# Patient Record
Sex: MALE | Race: BLACK OR AFRICAN AMERICAN | Employment: UNEMPLOYED | URBAN - METROPOLITAN AREA
[De-identification: names, ages, dates, MRNs, and addresses within clinical notes are randomized per-mention and may not be internally consistent; named-entity substitution may affect disease eponyms.]

---

## 2022-06-28 ENCOUNTER — HOSPITAL ENCOUNTER (EMERGENCY)
Age: 72
Discharge: ANOTHER HOSPITAL | End: 2022-06-28
Attending: EMERGENCY MEDICINE
Payer: MEDICARE

## 2022-06-28 ENCOUNTER — HOSPITAL ENCOUNTER (INPATIENT)
Age: 72
LOS: 4 days | Discharge: HOME HEALTH | DRG: 513 | End: 2022-07-02
Attending: INTERNAL MEDICINE | Admitting: INTERNAL MEDICINE
Payer: MEDICARE

## 2022-06-28 ENCOUNTER — APPOINTMENT (OUTPATIENT)
Dept: GENERAL RADIOLOGY | Age: 72
End: 2022-06-28
Payer: MEDICARE

## 2022-06-28 VITALS
HEIGHT: 71 IN | OXYGEN SATURATION: 99 % | WEIGHT: 149 LBS | HEART RATE: 75 BPM | DIASTOLIC BLOOD PRESSURE: 74 MMHG | BODY MASS INDEX: 20.86 KG/M2 | SYSTOLIC BLOOD PRESSURE: 112 MMHG | RESPIRATION RATE: 18 BRPM | TEMPERATURE: 98.1 F

## 2022-06-28 DIAGNOSIS — M86.9 FINGER OSTEOMYELITIS, LEFT (HCC): ICD-10-CM

## 2022-06-28 DIAGNOSIS — E87.6 HYPOKALEMIA: ICD-10-CM

## 2022-06-28 DIAGNOSIS — M86.9 FINGER OSTEOMYELITIS, LEFT (HCC): Primary | ICD-10-CM

## 2022-06-28 LAB
ALBUMIN SERPL-MCNC: 2.7 G/DL (ref 3.5–4.6)
ALP BLD-CCNC: 136 U/L (ref 35–104)
ALT SERPL-CCNC: 32 U/L (ref 0–41)
ANION GAP SERPL CALCULATED.3IONS-SCNC: 13 MEQ/L (ref 9–15)
AST SERPL-CCNC: 58 U/L (ref 0–40)
BILIRUB SERPL-MCNC: 0.5 MG/DL (ref 0.2–0.7)
BUN BLDV-MCNC: 10 MG/DL (ref 8–23)
CALCIUM SERPL-MCNC: 8.6 MG/DL (ref 8.5–9.9)
CHLORIDE BLD-SCNC: 94 MEQ/L (ref 95–107)
CO2: 27 MEQ/L (ref 20–31)
CREAT SERPL-MCNC: 0.72 MG/DL (ref 0.7–1.2)
GFR AFRICAN AMERICAN: >60
GFR NON-AFRICAN AMERICAN: >60
GLOBULIN: 4.4 G/DL (ref 2.3–3.5)
GLUCOSE BLD-MCNC: 137 MG/DL (ref 70–99)
HCT VFR BLD CALC: 31.1 % (ref 42–52)
HEMOGLOBIN: 10.7 G/DL (ref 13.7–17.5)
MCH RBC QN AUTO: 34.3 PG (ref 25.7–32.2)
MCHC RBC AUTO-ENTMCNC: 34.4 % (ref 32.3–36.5)
MCV RBC AUTO: 99.7 FL (ref 79–92.2)
PDW BLD-RTO: 13.3 % (ref 11.6–14.4)
PLATELET # BLD: 271 K/UL (ref 163–337)
POTASSIUM SERPL-SCNC: 3 MEQ/L (ref 3.4–4.9)
RBC # BLD: 3.12 M/UL (ref 4.63–6.08)
SODIUM BLD-SCNC: 134 MEQ/L (ref 135–144)
TOTAL PROTEIN: 7.1 G/DL (ref 6.3–8)
WBC # BLD: 19.8 K/UL (ref 4.2–9)

## 2022-06-28 PROCEDURE — 96367 TX/PROPH/DG ADDL SEQ IV INF: CPT

## 2022-06-28 PROCEDURE — 96365 THER/PROPH/DIAG IV INF INIT: CPT

## 2022-06-28 PROCEDURE — 36415 COLL VENOUS BLD VENIPUNCTURE: CPT

## 2022-06-28 PROCEDURE — 85027 COMPLETE CBC AUTOMATED: CPT

## 2022-06-28 PROCEDURE — 6360000002 HC RX W HCPCS: Performed by: INTERNAL MEDICINE

## 2022-06-28 PROCEDURE — 6360000002 HC RX W HCPCS: Performed by: EMERGENCY MEDICINE

## 2022-06-28 PROCEDURE — 99285 EMERGENCY DEPT VISIT HI MDM: CPT

## 2022-06-28 PROCEDURE — 96366 THER/PROPH/DIAG IV INF ADDON: CPT

## 2022-06-28 PROCEDURE — 73140 X-RAY EXAM OF FINGER(S): CPT

## 2022-06-28 PROCEDURE — 2580000003 HC RX 258: Performed by: INTERNAL MEDICINE

## 2022-06-28 PROCEDURE — 90715 TDAP VACCINE 7 YRS/> IM: CPT | Performed by: EMERGENCY MEDICINE

## 2022-06-28 PROCEDURE — 6370000000 HC RX 637 (ALT 250 FOR IP): Performed by: EMERGENCY MEDICINE

## 2022-06-28 PROCEDURE — 80053 COMPREHEN METABOLIC PANEL: CPT

## 2022-06-28 PROCEDURE — 90471 IMMUNIZATION ADMIN: CPT | Performed by: EMERGENCY MEDICINE

## 2022-06-28 PROCEDURE — 87040 BLOOD CULTURE FOR BACTERIA: CPT

## 2022-06-28 PROCEDURE — 2580000003 HC RX 258: Performed by: EMERGENCY MEDICINE

## 2022-06-28 PROCEDURE — 1210000000 HC MED SURG R&B

## 2022-06-28 RX ORDER — ACETAMINOPHEN 650 MG/1
650 SUPPOSITORY RECTAL EVERY 6 HOURS PRN
Status: DISCONTINUED | OUTPATIENT
Start: 2022-06-28 | End: 2022-07-02 | Stop reason: HOSPADM

## 2022-06-28 RX ORDER — AMLODIPINE BESYLATE 5 MG/1
5 TABLET ORAL DAILY
Status: ON HOLD | COMMUNITY
Start: 2022-04-13 | End: 2022-07-01 | Stop reason: HOSPADM

## 2022-06-28 RX ORDER — SODIUM CHLORIDE 0.9 % (FLUSH) 0.9 %
3 SYRINGE (ML) INJECTION EVERY 8 HOURS
Status: DISCONTINUED | OUTPATIENT
Start: 2022-06-28 | End: 2022-06-28 | Stop reason: HOSPADM

## 2022-06-28 RX ORDER — SODIUM CHLORIDE 0.9 % (FLUSH) 0.9 %
5-40 SYRINGE (ML) INJECTION EVERY 12 HOURS SCHEDULED
Status: DISCONTINUED | OUTPATIENT
Start: 2022-06-28 | End: 2022-07-02 | Stop reason: HOSPADM

## 2022-06-28 RX ORDER — ONDANSETRON 4 MG/1
4 TABLET, ORALLY DISINTEGRATING ORAL EVERY 8 HOURS PRN
Status: DISCONTINUED | OUTPATIENT
Start: 2022-06-28 | End: 2022-07-02 | Stop reason: HOSPADM

## 2022-06-28 RX ORDER — POTASSIUM CHLORIDE 20 MEQ/1
40 TABLET, EXTENDED RELEASE ORAL ONCE
Status: COMPLETED | OUTPATIENT
Start: 2022-06-28 | End: 2022-06-28

## 2022-06-28 RX ORDER — ACETAMINOPHEN 325 MG/1
650 TABLET ORAL EVERY 6 HOURS PRN
Status: DISCONTINUED | OUTPATIENT
Start: 2022-06-28 | End: 2022-07-02 | Stop reason: HOSPADM

## 2022-06-28 RX ORDER — ENOXAPARIN SODIUM 100 MG/ML
40 INJECTION SUBCUTANEOUS DAILY
Status: DISCONTINUED | OUTPATIENT
Start: 2022-06-28 | End: 2022-07-02 | Stop reason: HOSPADM

## 2022-06-28 RX ORDER — POLYETHYLENE GLYCOL 3350 17 G/17G
17 POWDER, FOR SOLUTION ORAL DAILY PRN
Status: DISCONTINUED | OUTPATIENT
Start: 2022-06-28 | End: 2022-07-02 | Stop reason: HOSPADM

## 2022-06-28 RX ORDER — SODIUM CHLORIDE 0.9 % (FLUSH) 0.9 %
5-40 SYRINGE (ML) INJECTION PRN
Status: DISCONTINUED | OUTPATIENT
Start: 2022-06-28 | End: 2022-07-02 | Stop reason: HOSPADM

## 2022-06-28 RX ORDER — ATORVASTATIN CALCIUM 40 MG/1
40 TABLET, FILM COATED ORAL DAILY
Status: ON HOLD | COMMUNITY
Start: 2022-06-08 | End: 2022-07-01 | Stop reason: HOSPADM

## 2022-06-28 RX ORDER — ONDANSETRON 2 MG/ML
4 INJECTION INTRAMUSCULAR; INTRAVENOUS EVERY 6 HOURS PRN
Status: DISCONTINUED | OUTPATIENT
Start: 2022-06-28 | End: 2022-07-02 | Stop reason: HOSPADM

## 2022-06-28 RX ORDER — SODIUM CHLORIDE 9 MG/ML
25 INJECTION, SOLUTION INTRAVENOUS PRN
Status: DISCONTINUED | OUTPATIENT
Start: 2022-06-28 | End: 2022-07-02 | Stop reason: HOSPADM

## 2022-06-28 RX ADMIN — TETANUS TOXOID, REDUCED DIPHTHERIA TOXOID AND ACELLULAR PERTUSSIS VACCINE, ADSORBED 0.5 ML: 5; 2.5; 8; 8; 2.5 SUSPENSION INTRAMUSCULAR at 13:11

## 2022-06-28 RX ADMIN — PIPERACILLIN AND TAZOBACTAM 3375 MG: 3; .375 INJECTION, POWDER, LYOPHILIZED, FOR SOLUTION INTRAVENOUS at 22:29

## 2022-06-28 RX ADMIN — CEFTRIAXONE 1000 MG: 1 INJECTION, POWDER, FOR SOLUTION INTRAMUSCULAR; INTRAVENOUS at 13:10

## 2022-06-28 RX ADMIN — POTASSIUM CHLORIDE 40 MEQ: 20 TABLET, EXTENDED RELEASE ORAL at 14:05

## 2022-06-28 RX ADMIN — VANCOMYCIN HYDROCHLORIDE 1000 MG: 1 INJECTION, POWDER, LYOPHILIZED, FOR SOLUTION INTRAVENOUS at 14:05

## 2022-06-28 RX ADMIN — Medication 10 ML: at 22:00

## 2022-06-28 ASSESSMENT — PAIN DESCRIPTION - LOCATION
LOCATION: ARM
LOCATION: FINGER (COMMENT WHICH ONE)

## 2022-06-28 ASSESSMENT — ENCOUNTER SYMPTOMS
VOMITING: 0
BACK PAIN: 0
SORE THROAT: 0
SINUS PAIN: 0
EYE REDNESS: 0
NAUSEA: 0
EYE PAIN: 0
SHORTNESS OF BREATH: 0
COUGH: 0
ABDOMINAL PAIN: 0
COLOR CHANGE: 1

## 2022-06-28 ASSESSMENT — PAIN SCALES - GENERAL
PAINLEVEL_OUTOF10: 3
PAINLEVEL_OUTOF10: 0

## 2022-06-28 ASSESSMENT — PAIN - FUNCTIONAL ASSESSMENT: PAIN_FUNCTIONAL_ASSESSMENT: 0-10

## 2022-06-28 ASSESSMENT — PAIN DESCRIPTION - DESCRIPTORS: DESCRIPTORS: ACHING

## 2022-06-28 ASSESSMENT — PAIN DESCRIPTION - FREQUENCY: FREQUENCY: CONTINUOUS

## 2022-06-28 ASSESSMENT — PAIN DESCRIPTION - ORIENTATION: ORIENTATION: LEFT

## 2022-06-28 NOTE — ED NOTES
Call from transfer center with bed assignment. Patient assigned bed 480-1. Number for report is [phone].        Theo García  06/28/22 1827

## 2022-06-28 NOTE — ED NOTES
Dr. Tanner Marcos states Dr. Sergio Baxter is on call. Dr. Sergio Baxter spoke with Dr. Alex Heath.       Linda Stanley  06/28/22 6662

## 2022-06-28 NOTE — ED NOTES
Patient report called to [address] [place] [address] room [address], [place]  06/28/22 6378
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
## 2022-06-28 NOTE — ED NOTES
ETA on [place] ([facility]) is 20:30.       Ana Muro  06/28/22 0959
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
## 2022-06-28 NOTE — ED NOTES
Dr. Jeffry Soliz requested patient be transferred to [facility]. Called transfer to [facility] hospitalist for  to  consult.       [place]ia Zazueta  06/28/22 5775

## 2022-06-29 PROBLEM — M86.142: Status: ACTIVE | Noted: 2022-06-28

## 2022-06-29 LAB
ALBUMIN SERPL-MCNC: 2.7 G/DL (ref 3.5–4.6)
ALP BLD-CCNC: 130 U/L (ref 35–104)
ALT SERPL-CCNC: 41 U/L (ref 0–41)
ANION GAP SERPL CALCULATED.3IONS-SCNC: 12 MEQ/L (ref 9–15)
AST SERPL-CCNC: 67 U/L (ref 0–40)
BANDED NEUTROPHILS RELATIVE PERCENT: 1 %
BASOPHILS ABSOLUTE: 0 K/UL (ref 0–0.2)
BASOPHILS RELATIVE PERCENT: 0.3 %
BILIRUB SERPL-MCNC: 0.6 MG/DL (ref 0.2–0.7)
BUN BLDV-MCNC: 8 MG/DL (ref 8–23)
CALCIUM SERPL-MCNC: 8.3 MG/DL (ref 8.5–9.9)
CHLORIDE BLD-SCNC: 94 MEQ/L (ref 95–107)
CO2: 27 MEQ/L (ref 20–31)
CREAT SERPL-MCNC: 0.68 MG/DL (ref 0.7–1.2)
EOSINOPHILS ABSOLUTE: 0 K/UL (ref 0–0.7)
EOSINOPHILS RELATIVE PERCENT: 0.1 %
GFR AFRICAN AMERICAN: >60
GFR NON-AFRICAN AMERICAN: >60
GLOBULIN: 4.1 G/DL (ref 2.3–3.5)
GLUCOSE BLD-MCNC: 127 MG/DL (ref 70–99)
HCT VFR BLD CALC: 32.6 % (ref 42–52)
HEMOGLOBIN: 11 G/DL (ref 14–18)
LYMPHOCYTES ABSOLUTE: 0.2 K/UL (ref 1–4.8)
LYMPHOCYTES RELATIVE PERCENT: 1 %
MAGNESIUM: 1.8 MG/DL (ref 1.7–2.4)
MCH RBC QN AUTO: 34.8 PG (ref 27–31.3)
MCHC RBC AUTO-ENTMCNC: 33.6 % (ref 33–37)
MCV RBC AUTO: 103.3 FL (ref 80–100)
MONOCYTES ABSOLUTE: 1.9 K/UL (ref 0.2–0.8)
MONOCYTES RELATIVE PERCENT: 12.1 %
NEUTROPHILS ABSOLUTE: 13.9 K/UL (ref 1.4–6.5)
NEUTROPHILS RELATIVE PERCENT: 86 %
PDW BLD-RTO: 14.6 % (ref 11.5–14.5)
PLATELET # BLD: 283 K/UL (ref 130–400)
PLATELET SLIDE REVIEW: NORMAL
POTASSIUM REFLEX MAGNESIUM: 3 MEQ/L (ref 3.4–4.9)
RBC # BLD: 3.16 M/UL (ref 4.7–6.1)
RBC # BLD: NORMAL 10*6/UL
SODIUM BLD-SCNC: 133 MEQ/L (ref 135–144)
TOTAL PROTEIN: 6.8 G/DL (ref 6.3–8)
WBC # BLD: 16 K/UL (ref 4.8–10.8)

## 2022-06-29 PROCEDURE — 80053 COMPREHEN METABOLIC PANEL: CPT

## 2022-06-29 PROCEDURE — 6370000000 HC RX 637 (ALT 250 FOR IP): Performed by: INTERNAL MEDICINE

## 2022-06-29 PROCEDURE — 99222 1ST HOSP IP/OBS MODERATE 55: CPT | Performed by: INTERNAL MEDICINE

## 2022-06-29 PROCEDURE — 36415 COLL VENOUS BLD VENIPUNCTURE: CPT

## 2022-06-29 PROCEDURE — 2580000003 HC RX 258: Performed by: INTERNAL MEDICINE

## 2022-06-29 PROCEDURE — 6360000002 HC RX W HCPCS: Performed by: INTERNAL MEDICINE

## 2022-06-29 PROCEDURE — 85025 COMPLETE CBC W/AUTO DIFF WBC: CPT

## 2022-06-29 PROCEDURE — 99211 OFF/OP EST MAY X REQ PHY/QHP: CPT

## 2022-06-29 PROCEDURE — 1210000000 HC MED SURG R&B

## 2022-06-29 PROCEDURE — 83735 ASSAY OF MAGNESIUM: CPT

## 2022-06-29 RX ORDER — AMLODIPINE BESYLATE 5 MG/1
5 TABLET ORAL DAILY
Status: DISCONTINUED | OUTPATIENT
Start: 2022-06-29 | End: 2022-06-30

## 2022-06-29 RX ORDER — CARVEDILOL 12.5 MG/1
25 TABLET ORAL 2 TIMES DAILY
Status: DISCONTINUED | OUTPATIENT
Start: 2022-06-29 | End: 2022-07-02 | Stop reason: HOSPADM

## 2022-06-29 RX ORDER — POTASSIUM CHLORIDE 750 MG/1
20 CAPSULE, EXTENDED RELEASE ORAL 3 TIMES DAILY
Status: COMPLETED | OUTPATIENT
Start: 2022-06-29 | End: 2022-06-29

## 2022-06-29 RX ORDER — ATORVASTATIN CALCIUM 40 MG/1
40 TABLET, FILM COATED ORAL DAILY
Status: DISCONTINUED | OUTPATIENT
Start: 2022-06-29 | End: 2022-06-30

## 2022-06-29 RX ADMIN — ATORVASTATIN CALCIUM 40 MG: 40 TABLET, FILM COATED ORAL at 10:34

## 2022-06-29 RX ADMIN — Medication 10 ML: at 20:45

## 2022-06-29 RX ADMIN — PIPERACILLIN AND TAZOBACTAM 3375 MG: 3; .375 INJECTION, POWDER, LYOPHILIZED, FOR SOLUTION INTRAVENOUS at 22:21

## 2022-06-29 RX ADMIN — PIPERACILLIN AND TAZOBACTAM 3375 MG: 3; .375 INJECTION, POWDER, LYOPHILIZED, FOR SOLUTION INTRAVENOUS at 06:10

## 2022-06-29 RX ADMIN — VANCOMYCIN HYDROCHLORIDE 750 MG: 750 INJECTION, POWDER, LYOPHILIZED, FOR SOLUTION INTRAVENOUS at 01:51

## 2022-06-29 RX ADMIN — CARVEDILOL 25 MG: 12.5 TABLET, FILM COATED ORAL at 10:34

## 2022-06-29 RX ADMIN — ACETAMINOPHEN 650 MG: 325 TABLET ORAL at 00:15

## 2022-06-29 RX ADMIN — CARVEDILOL 25 MG: 12.5 TABLET, FILM COATED ORAL at 20:36

## 2022-06-29 RX ADMIN — ACETAMINOPHEN 650 MG: 325 TABLET ORAL at 22:34

## 2022-06-29 RX ADMIN — POTASSIUM CHLORIDE 20 MEQ: 750 CAPSULE, EXTENDED RELEASE ORAL at 10:35

## 2022-06-29 RX ADMIN — POTASSIUM CHLORIDE 20 MEQ: 750 CAPSULE, EXTENDED RELEASE ORAL at 14:23

## 2022-06-29 RX ADMIN — ACETAMINOPHEN 650 MG: 325 TABLET ORAL at 14:24

## 2022-06-29 RX ADMIN — PIPERACILLIN AND TAZOBACTAM 3375 MG: 3; .375 INJECTION, POWDER, LYOPHILIZED, FOR SOLUTION INTRAVENOUS at 14:30

## 2022-06-29 RX ADMIN — POTASSIUM CHLORIDE 20 MEQ: 750 CAPSULE, EXTENDED RELEASE ORAL at 20:36

## 2022-06-29 RX ADMIN — Medication 10 ML: at 10:38

## 2022-06-29 ASSESSMENT — PAIN SCALES - GENERAL
PAINLEVEL_OUTOF10: 0
PAINLEVEL_OUTOF10: 0
PAINLEVEL_OUTOF10: 2
PAINLEVEL_OUTOF10: 6
PAINLEVEL_OUTOF10: 2

## 2022-06-29 ASSESSMENT — PAIN DESCRIPTION - ORIENTATION
ORIENTATION: LEFT
ORIENTATION: LEFT

## 2022-06-29 ASSESSMENT — PAIN DESCRIPTION - LOCATION
LOCATION: HAND
LOCATION: ARM;HAND

## 2022-06-29 ASSESSMENT — ENCOUNTER SYMPTOMS
DIARRHEA: 0
SHORTNESS OF BREATH: 0
COUGH: 0

## 2022-06-29 ASSESSMENT — PAIN DESCRIPTION - DESCRIPTORS
DESCRIPTORS: ACHING
DESCRIPTORS: ACHING
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
## 2022-06-29 NOTE — PROGRESS NOTES
1915: Pt arrived via EMS. Pt transferred to bed. 2000: Pain is currently controlled. A&O x 4. Some slight swelling and warmness noted on left hand as well as abrasions to b/l lower extremities. Skin dual assessment completed with Uli Schulz RN.

## 2022-06-29 NOTE — PROGRESS NOTES
ORTHO SURG CONSULT CALLED TO DR Sindy Jalloh VIA [facility] Electronically signed by Sara Cisneros on 6/29/2022 at 9:01 AM

## 2022-06-29 NOTE — PROGRESS NOTES
DVT / VTE PROPHYLAXIS EVALUATION    Estimated Creatinine Clearance: 87 mL/min (based on SCr of 0.72 mg/dL). Recent Labs     06/28/22  1159   BUN 10   CREATININE 0.72      HGB 10.7*   HCT 31.1*     ADMITTING DX OR CHIEF COMPLAINT?  Hand injury   WARFARIN? DOAC'S? no  ANY APPARENT BLEEDING? no  SCHEDULED SURGERY? no     Current order:  Enoxaparin 40 mg SUBQ once daily      Plan:  No intervention recommended, continue current VTE prophylaxis as ordered     Patient Weight (kg)      50.9 and below .9 101-150.9 151-174.9 175 or greater   Estimated   CrCl  (ml/min) 30 or greater []   30 mg   SUBQ daily   [x]   40 mg   SUBQ daily []  30 mg SUBQ   BID  []  40 mg   SUBQ   BID []  60mg SUBQ BID    15-29.9 []  UFH 5000   units SUBQ BID []  30 mg   SUBQ daily [] 30 mg SUBQ   daily []  40 mg SUBQ   daily [] 60 mg SUBQ   daily    Less than 15 or dialysis []  UFH 5000   units SUBQ BID [] UFH 5000 units SUBQ TID []  UFH 7500   units   SUBQ TID         [facility] PharmD
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
## 2022-06-29 NOTE — CARE COORDINATION
[facility] EMERGENCY [facility] CENTER AT [place] Case Management   Initial Discharge Assessment    Met with patient at bedside to discuss discharge plan. PCP: Franny Castle DO                                  Date of Last Visit: sees PCP every 6 months and PRN  [facility] Patient: No        If no PCP, list provided? N/A    Discharge Planning    Living Arrangements: Patient lives independently at home, no stairs to bed/bath. Who do you live with? Girlfriend    Who helps you with your care: Patient is fully independent with ADLs and IADLs. If lives at home: Do you have any barriers navigating in your home? No    Patient can perform ADL? Yes    Current Services (outpatient and in home): None    Dialysis: No    Is transportation available to get to your appointments? Yes - Patient drives or girlfriend can transport. DME Equipment: None    Respiratory equipment: None    Respiratory provider: EVETTE     Pharmacy: [facility] in [address] [place] [address] with Medication Assistance Program?  No      Patient agreeable to [address]? Yes, [address]. Patient agreeable to SNF/Rehab? N/A    Other discharge needs identified? Other - May need IV ATB at DC (pending ID consult). Does Patient Have a High-Risk for Readmission Diagnosis (CHF, PN, MI, COPD)? No    Initial Discharge Plan? (Note: please see concurrent daily documentation for any updates after initial note). Home with GF (+ [facility] if needed).     Readmission Risk              Risk of Unplanned Readmission:  6         Electronically signed by Bettina Concepcion RN on 6/29/2022 at 8:21 AM
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
## 2022-06-29 NOTE — CARE COORDINATION
06/29/22    From: Home with GF, independent. No DME. Admit: Tx from [place] ER with swelling of (L) hand/finger s/p injury 2 wks ago --> adm with osteomyelitis. PMH: Htn    Anticipated Discharge Disposition: Home with GF, [facility] [place] [address] if needed. Patient Mobility or PT/OT ordered: EVETTE    Consults: MAREK Tavares. Clinical: WBC 19.8 on adm --> 16.0 on 6/29. On Zosyn, Vanco IV. Barriers to Discharge: Osteomyelitis, pending ortho consult for plan. Will need ID plan for ATB. Assessments: CMI and DCCOP done.

## 2022-06-29 NOTE — PROGRESS NOTES
Progress Note  Date:2022       Room:[place] [address]-  Patient Irene Fajardo     YOB: 1950     Age:74 y.o. Subjective    Subjective:  Symptoms:  No shortness of breath, malaise, cough, chest pain, weakness, headache, chest pressure, anorexia, diarrhea or anxiety. Review of Systems   Respiratory: Negative for cough and shortness of breath. Cardiovascular: Negative for chest pain. Gastrointestinal: Negative for anorexia and diarrhea. Neurological: Negative for weakness. Objective         Vitals Last 24 Hours:  TEMPERATURE:  Temp  Av.3 °F (36.8 °C)  Min: 98.1 °F (36.7 °C)  Max: 98.4 °F (36.9 °C)  RESPIRATIONS RANGE: Resp  Av  Min: 18  Max: 18  PULSE OXIMETRY RANGE: SpO2  Av %  Min: 98 %  Max: 100 %  PULSE RANGE: Pulse  Av  Min: 73  Max: 75  BLOOD PRESSURE RANGE: Systolic (89LZF), VCE:207 , Min:110 , BQH:766   ; Diastolic (19XDW), TONA:70, Min:64, Max:74    I/O (24Hr): Intake/Output Summary (Last 24 hours) at 2022 1035  Last data filed at 2022 0610  Gross per 24 hour   Intake 1253.92 ml   Output 300 ml   Net 953.92 ml     Objective:  General Appearance:  Comfortable, well-appearing and in no acute distress. Vital signs: (most recent): Blood pressure 120/64, pulse 74, temperature 98.4 °F (36.9 °C), height 5' 11\" (1.803 m), weight 145 lb (65.8 kg), SpO2 100 %. HEENT: Normal HEENT exam.    Heart: Normal rate. Regular rhythm. Abdomen: Abdomen is soft. Bowel sounds are normal.   There is no epigastric area tenderness. Extremities: (Left hand swelling, middle finger necrotic head)  Pulses: Distal pulses are intact. Neurological: Patient is alert. Pupils:  Pupils are equal, round, and reactive to light. Skin:  Warm and dry.       Labs/Imaging/Diagnostics    Labs:  CBC:  Recent Labs     22  1159 22  0614   WBC 19.8* 16.0*   RBC 3.12* 3.16*   HGB 10.7* 11.0*   HCT 31.1* 32.6*   MCV 99.7* 103.3*   RDW 13.3 14.6*   PLT

## 2022-06-29 NOTE — PROGRESS NOTES
[facility] Pharmacokinetic Monitoring Service - Vancomycin     Nicolasa Leger is a 66 y.o. male starting on vancomycin therapy for skin and soft tissue infection. Pharmacy consulted by Dr. Anthony Mcgee for monitoring and adjustment. Target Concentration: Goal trough of 10-15 mg/L and AUC/OLIVIA <500 mg*hr/L    Additional Antimicrobials: Zosyn    Pertinent Laboratory Values: Wt Readings from Last 1 Encounters:   06/28/22 149 lb (67.6 kg)     Temp Readings from Last 1 Encounters:   06/28/22 98.1 °F (36.7 °C) (Oral)     Estimated Creatinine Clearance: 87 mL/min (based on SCr of 0.72 mg/dL). Recent Labs     06/28/22  1159   CREATININE 0.72   WBC 19.8*     Procalcitonin: N/A    Pertinent Cultures:  Culture Date Source Results   N/A N/A N/A     MRSA Nasal Swab: N/A. Non-respiratory infection.     Plan:  Dosing recommendations based on Bayesian software  Start vancomycin 750mg IV Q12 hours   Anticipated AUC of 426mg/L.hr  and trough concentration of 13.4mg/L at steady state  Renal labs as indicated   Vancomycin concentration ordered for 06/30/22 @ 0600   Pharmacy will continue to monitor patient and adjust therapy as indicated    Thank you for the consult,    Marisol Rogers, PharmD, BCPS   6/28/2022 9:31 PM

## 2022-06-29 NOTE — PROGRESS NOTES
Comfort and safety maintained this shift, pt reports very minimal pain to wound of left middle finger only with use of finger. Left hand remains edematous. No acute concern noted otherwise. Report given to oncoming RN.

## 2022-06-30 ENCOUNTER — ANESTHESIA EVENT (OUTPATIENT)
Dept: OPERATING ROOM | Age: 72
DRG: 513 | End: 2022-06-30
Payer: MEDICARE

## 2022-06-30 ENCOUNTER — ANESTHESIA (OUTPATIENT)
Dept: OPERATING ROOM | Age: 72
DRG: 513 | End: 2022-06-30
Payer: MEDICARE

## 2022-06-30 LAB
ALBUMIN SERPL-MCNC: 2.4 G/DL (ref 3.5–4.6)
ALP BLD-CCNC: 128 U/L (ref 35–104)
ALT SERPL-CCNC: 53 U/L (ref 0–41)
ANION GAP SERPL CALCULATED.3IONS-SCNC: 14 MEQ/L (ref 9–15)
AST SERPL-CCNC: 81 U/L (ref 0–40)
BILIRUB SERPL-MCNC: 0.5 MG/DL (ref 0.2–0.7)
BUN BLDV-MCNC: 6 MG/DL (ref 8–23)
CALCIUM SERPL-MCNC: 8.3 MG/DL (ref 8.5–9.9)
CHLORIDE BLD-SCNC: 93 MEQ/L (ref 95–107)
CO2: 22 MEQ/L (ref 20–31)
CREAT SERPL-MCNC: 0.68 MG/DL (ref 0.7–1.2)
GFR AFRICAN AMERICAN: >60
GFR NON-AFRICAN AMERICAN: >60
GLOBULIN: 4.7 G/DL (ref 2.3–3.5)
GLUCOSE BLD-MCNC: 116 MG/DL (ref 70–99)
HBA1C MFR BLD: 5.3 % (ref 4.8–5.9)
POTASSIUM REFLEX MAGNESIUM: 4.3 MEQ/L (ref 3.4–4.9)
SODIUM BLD-SCNC: 129 MEQ/L (ref 135–144)
TOTAL PROTEIN: 7.1 G/DL (ref 6.3–8)

## 2022-06-30 PROCEDURE — 87176 TISSUE HOMOGENIZATION CULTR: CPT

## 2022-06-30 PROCEDURE — 2709999900 HC NON-CHARGEABLE SUPPLY: Performed by: ORTHOPAEDIC SURGERY

## 2022-06-30 PROCEDURE — 7100000000 HC PACU RECOVERY - FIRST 15 MIN: Performed by: ORTHOPAEDIC SURGERY

## 2022-06-30 PROCEDURE — 2500000003 HC RX 250 WO HCPCS: Performed by: NURSE ANESTHETIST, CERTIFIED REGISTERED

## 2022-06-30 PROCEDURE — 2500000003 HC RX 250 WO HCPCS: Performed by: ORTHOPAEDIC SURGERY

## 2022-06-30 PROCEDURE — 3600000013 HC SURGERY LEVEL 3 ADDTL 15MIN: Performed by: ORTHOPAEDIC SURGERY

## 2022-06-30 PROCEDURE — 83036 HEMOGLOBIN GLYCOSYLATED A1C: CPT

## 2022-06-30 PROCEDURE — 3600000003 HC SURGERY LEVEL 3 BASE: Performed by: ORTHOPAEDIC SURGERY

## 2022-06-30 PROCEDURE — 2580000003 HC RX 258: Performed by: INTERNAL MEDICINE

## 2022-06-30 PROCEDURE — 76942 ECHO GUIDE FOR BIOPSY: CPT | Performed by: ANESTHESIOLOGY

## 2022-06-30 PROCEDURE — 1210000000 HC MED SURG R&B

## 2022-06-30 PROCEDURE — 3700000000 HC ANESTHESIA ATTENDED CARE: Performed by: ORTHOPAEDIC SURGERY

## 2022-06-30 PROCEDURE — 6360000002 HC RX W HCPCS: Performed by: NURSE ANESTHETIST, CERTIFIED REGISTERED

## 2022-06-30 PROCEDURE — 3700000001 HC ADD 15 MINUTES (ANESTHESIA): Performed by: ORTHOPAEDIC SURGERY

## 2022-06-30 PROCEDURE — 6360000002 HC RX W HCPCS: Performed by: INTERNAL MEDICINE

## 2022-06-30 PROCEDURE — 6370000000 HC RX 637 (ALT 250 FOR IP): Performed by: INTERNAL MEDICINE

## 2022-06-30 PROCEDURE — 93005 ELECTROCARDIOGRAM TRACING: CPT

## 2022-06-30 PROCEDURE — 2500000003 HC RX 250 WO HCPCS: Performed by: ANESTHESIOLOGY

## 2022-06-30 PROCEDURE — 36415 COLL VENOUS BLD VENIPUNCTURE: CPT

## 2022-06-30 PROCEDURE — 0X6R0Z3 DETACHMENT AT LEFT MIDDLE FINGER, LOW, OPEN APPROACH: ICD-10-PCS | Performed by: ORTHOPAEDIC SURGERY

## 2022-06-30 PROCEDURE — 26951 AMPUTATION OF FINGER/THUMB: CPT | Performed by: ORTHOPAEDIC SURGERY

## 2022-06-30 PROCEDURE — 87070 CULTURE OTHR SPECIMN AEROBIC: CPT

## 2022-06-30 PROCEDURE — 2580000003 HC RX 258: Performed by: ORTHOPAEDIC SURGERY

## 2022-06-30 PROCEDURE — 88311 DECALCIFY TISSUE: CPT

## 2022-06-30 PROCEDURE — A4217 STERILE WATER/SALINE, 500 ML: HCPCS | Performed by: ORTHOPAEDIC SURGERY

## 2022-06-30 PROCEDURE — 80053 COMPREHEN METABOLIC PANEL: CPT

## 2022-06-30 PROCEDURE — 88305 TISSUE EXAM BY PATHOLOGIST: CPT

## 2022-06-30 PROCEDURE — 99232 SBSQ HOSP IP/OBS MODERATE 35: CPT | Performed by: INTERNAL MEDICINE

## 2022-06-30 PROCEDURE — 87075 CULTR BACTERIA EXCEPT BLOOD: CPT

## 2022-06-30 PROCEDURE — 7100000001 HC PACU RECOVERY - ADDTL 15 MIN: Performed by: ORTHOPAEDIC SURGERY

## 2022-06-30 RX ORDER — MEPERIDINE HYDROCHLORIDE 25 MG/ML
12.5 INJECTION INTRAMUSCULAR; INTRAVENOUS; SUBCUTANEOUS
Status: DISCONTINUED | OUTPATIENT
Start: 2022-06-30 | End: 2022-06-30 | Stop reason: HOSPADM

## 2022-06-30 RX ORDER — SODIUM CHLORIDE 9 MG/ML
INJECTION, SOLUTION INTRAVENOUS CONTINUOUS
Status: DISCONTINUED | OUTPATIENT
Start: 2022-06-30 | End: 2022-07-02

## 2022-06-30 RX ORDER — SODIUM CHLORIDE 0.9 % (FLUSH) 0.9 %
5-40 SYRINGE (ML) INJECTION PRN
Status: DISCONTINUED | OUTPATIENT
Start: 2022-06-30 | End: 2022-06-30 | Stop reason: HOSPADM

## 2022-06-30 RX ORDER — MAGNESIUM HYDROXIDE 1200 MG/15ML
LIQUID ORAL CONTINUOUS PRN
Status: DISCONTINUED | OUTPATIENT
Start: 2022-06-30 | End: 2022-06-30 | Stop reason: HOSPADM

## 2022-06-30 RX ORDER — OXYCODONE HYDROCHLORIDE 5 MG/1
5 TABLET ORAL PRN
Status: DISCONTINUED | OUTPATIENT
Start: 2022-06-30 | End: 2022-06-30 | Stop reason: HOSPADM

## 2022-06-30 RX ORDER — OXYCODONE HYDROCHLORIDE 5 MG/1
10 TABLET ORAL PRN
Status: DISCONTINUED | OUTPATIENT
Start: 2022-06-30 | End: 2022-06-30 | Stop reason: HOSPADM

## 2022-06-30 RX ORDER — LISINOPRIL 20 MG/1
40 TABLET ORAL DAILY
Status: DISCONTINUED | OUTPATIENT
Start: 2022-06-30 | End: 2022-07-02 | Stop reason: HOSPADM

## 2022-06-30 RX ORDER — FENTANYL CITRATE 50 UG/ML
50 INJECTION, SOLUTION INTRAMUSCULAR; INTRAVENOUS EVERY 10 MIN PRN
Status: DISCONTINUED | OUTPATIENT
Start: 2022-06-30 | End: 2022-06-30 | Stop reason: HOSPADM

## 2022-06-30 RX ORDER — BUPIVACAINE HYDROCHLORIDE 5 MG/ML
INJECTION, SOLUTION EPIDURAL; INTRACAUDAL PRN
Status: DISCONTINUED | OUTPATIENT
Start: 2022-06-30 | End: 2022-06-30 | Stop reason: HOSPADM

## 2022-06-30 RX ORDER — METOCLOPRAMIDE HYDROCHLORIDE 5 MG/ML
10 INJECTION INTRAMUSCULAR; INTRAVENOUS
Status: DISCONTINUED | OUTPATIENT
Start: 2022-06-30 | End: 2022-06-30 | Stop reason: HOSPADM

## 2022-06-30 RX ORDER — PROPOFOL 10 MG/ML
INJECTION, EMULSION INTRAVENOUS CONTINUOUS PRN
Status: DISCONTINUED | OUTPATIENT
Start: 2022-06-30 | End: 2022-06-30 | Stop reason: SDUPTHER

## 2022-06-30 RX ORDER — GLYCOPYRROLATE 0.2 MG/ML
INJECTION INTRAMUSCULAR; INTRAVENOUS PRN
Status: DISCONTINUED | OUTPATIENT
Start: 2022-06-30 | End: 2022-06-30 | Stop reason: SDUPTHER

## 2022-06-30 RX ORDER — ONDANSETRON 2 MG/ML
4 INJECTION INTRAMUSCULAR; INTRAVENOUS
Status: DISCONTINUED | OUTPATIENT
Start: 2022-06-30 | End: 2022-06-30 | Stop reason: HOSPADM

## 2022-06-30 RX ORDER — KETAMINE HYDROCHLORIDE 100 MG/ML
INJECTION, SOLUTION INTRAMUSCULAR; INTRAVENOUS PRN
Status: DISCONTINUED | OUTPATIENT
Start: 2022-06-30 | End: 2022-06-30 | Stop reason: SDUPTHER

## 2022-06-30 RX ORDER — SODIUM CHLORIDE 9 MG/ML
25 INJECTION, SOLUTION INTRAVENOUS PRN
Status: DISCONTINUED | OUTPATIENT
Start: 2022-06-30 | End: 2022-06-30 | Stop reason: HOSPADM

## 2022-06-30 RX ORDER — SODIUM CHLORIDE 0.9 % (FLUSH) 0.9 %
5-40 SYRINGE (ML) INJECTION EVERY 12 HOURS SCHEDULED
Status: DISCONTINUED | OUTPATIENT
Start: 2022-06-30 | End: 2022-06-30 | Stop reason: HOSPADM

## 2022-06-30 RX ORDER — BUPIVACAINE HYDROCHLORIDE AND EPINEPHRINE 5; 5 MG/ML; UG/ML
INJECTION, SOLUTION EPIDURAL; INTRACAUDAL; PERINEURAL PRN
Status: DISCONTINUED | OUTPATIENT
Start: 2022-06-30 | End: 2022-06-30 | Stop reason: SDUPTHER

## 2022-06-30 RX ORDER — DIPHENHYDRAMINE HYDROCHLORIDE 50 MG/ML
12.5 INJECTION INTRAMUSCULAR; INTRAVENOUS
Status: DISCONTINUED | OUTPATIENT
Start: 2022-06-30 | End: 2022-06-30 | Stop reason: HOSPADM

## 2022-06-30 RX ADMIN — BUPIVACAINE HYDROCHLORIDE AND EPINEPHRINE BITARTRATE 10 ML: 5; .005 INJECTION, SOLUTION EPIDURAL; INTRACAUDAL; PERINEURAL at 14:33

## 2022-06-30 RX ADMIN — CARVEDILOL 25 MG: 12.5 TABLET, FILM COATED ORAL at 19:56

## 2022-06-30 RX ADMIN — PIPERACILLIN AND TAZOBACTAM 3375 MG: 3; .375 INJECTION, POWDER, LYOPHILIZED, FOR SOLUTION INTRAVENOUS at 13:58

## 2022-06-30 RX ADMIN — PROPOFOL 30 MG: 10 INJECTION, EMULSION INTRAVENOUS at 15:28

## 2022-06-30 RX ADMIN — CARVEDILOL 25 MG: 12.5 TABLET, FILM COATED ORAL at 09:22

## 2022-06-30 RX ADMIN — SODIUM CHLORIDE: 9 INJECTION, SOLUTION INTRAVENOUS at 18:20

## 2022-06-30 RX ADMIN — Medication 10 ML: at 21:54

## 2022-06-30 RX ADMIN — PIPERACILLIN AND TAZOBACTAM 3375 MG: 3; .375 INJECTION, POWDER, LYOPHILIZED, FOR SOLUTION INTRAVENOUS at 21:43

## 2022-06-30 RX ADMIN — PROPOFOL 30 MG: 10 INJECTION, EMULSION INTRAVENOUS at 15:33

## 2022-06-30 RX ADMIN — PROPOFOL 30 MG: 10 INJECTION, EMULSION INTRAVENOUS at 15:21

## 2022-06-30 RX ADMIN — KETAMINE HYDROCHLORIDE 20 MG: 100 INJECTION INTRAMUSCULAR; INTRAVENOUS at 15:33

## 2022-06-30 RX ADMIN — SODIUM CHLORIDE: 9 INJECTION, SOLUTION INTRAVENOUS at 10:27

## 2022-06-30 RX ADMIN — PIPERACILLIN AND TAZOBACTAM 3375 MG: 3; .375 INJECTION, POWDER, LYOPHILIZED, FOR SOLUTION INTRAVENOUS at 05:55

## 2022-06-30 RX ADMIN — GLYCOPYRROLATE 0.2 MG: 0.4 INJECTION INTRAMUSCULAR; INTRAVENOUS at 15:37

## 2022-06-30 RX ADMIN — Medication 10 ML: at 09:25

## 2022-06-30 RX ADMIN — PROPOFOL 100 MCG/KG/MIN: 10 INJECTION, EMULSION INTRAVENOUS at 15:15

## 2022-06-30 ASSESSMENT — ENCOUNTER SYMPTOMS
SHORTNESS OF BREATH: 0
DIARRHEA: 0
COUGH: 0

## 2022-06-30 NOTE — PROGRESS NOTES
Shift assessment complete, meds administered per MAR orders. Pt complains of some mild discomfort in the left arm, Tylenol was administered PRN. Pt woke up to use the restroom, and accidentally ripped out his IV in the right AC. A new IV was placed in the right forearm by Tad Klein RN after failed attempts by me. Pt tolerated well. Will continue to monitor.

## 2022-06-30 NOTE — ANESTHESIA PROCEDURE NOTES
Peripheral Block    Patient location during procedure: pre-op  Reason for block: post-op pain management and at surgeon's request  Start time: 6/30/2022 2:30 PM  End time: 6/30/2022 2:35 PM  Staffing  Performed: anesthesiologist   Anesthesiologist: Chase Grubbs MD  Preanesthetic Checklist  Completed: patient identified, IV checked, site marked, risks and benefits discussed, surgical/procedural consents, equipment checked, pre-op evaluation, timeout performed, anesthesia consent given, oxygen available and monitors applied/VS acknowledged  Peripheral Block   Patient position: supine  Prep: ChloraPrep  Provider prep: mask and sterile gloves (Sterile probe cover)  Patient monitoring: cardiac monitor, continuous pulse ox, frequent blood pressure checks and IV access  Block type: Median and Radial  Laterality: left  Injection technique: single-shot  Guidance: ultrasound guided  Local infiltration: bupivacaine (WITH EPINEPHRINE)  Infiltration strength: 0.5 %  Local infiltration: bupivacaine (WITH EPINEPHRINE)  Dose: 10 mL    Needle   Needle type: combined needle/nerve stimulator   Needle gauge: 22 G  Needle localization: anatomical landmarks and ultrasound guidance  Needle length: 5 cm  Assessment   Injection assessment: negative aspiration for heme, no paresthesia on injection and local visualized surrounding nerve on ultrasound  Paresthesia pain: immediately resolved  Slow fractionated injection: yes  Hemodynamics: stable  Real-time US image taken/store: yes    Additional Notes  Ultrasound image printed and saved in patient chart.     Sterile probe cover used

## 2022-06-30 NOTE — CARE COORDINATION
6/30/22    From: Home with GF, independent. No DME. Admit: Tx from [facility] ER with swelling of (L) hand/finger s/p injury 2 wks ago --> adm with osteomyelitis. PMH: Htn    Anticipated Discharge Disposition: Home with GF, [facility] if needed. ** IV check sent 6/30 **    Patient Mobility or PT/OT ordered: NA    Consults: MAREK Tavares. Clinical: WBC 19.8 on adm --> 16.0 on 6/29. Zosyn IV. Plan left middle finger amputation with ID plan pending. Barriers to Discharge: Osteomyelitis, pending     Assessments: CMI and DCCOP done.

## 2022-06-30 NOTE — PROGRESS NOTES
7447: Assessment completed and charted by this RN. VSS. A&Ox4. Pt awake in bed. Trace edema noted to L hand. Left middle finger ADINA. Denies further needs at this time. Will continue rounds     5758: Pt NPO at this time for Left middle finger amputation. Consent signed in chart. All questions answered    1400: Patient going off floor at this time for surgery. 1710: Patient returned to floor. VSS. A&Ox4. Pain is controlled at this time. Dressing to L hand and L middle finger is CDI.  Pt denies further needs     Electronically signed by Valentine Whitaker RN on 6/30/22 at 10:38 AM EDT

## 2022-06-30 NOTE — CARE COORDINATION
This LSW met with patient at bedside this am.   IMM completed. Patient and I discussed discharge plans-  Patient states that he will return home where he lives with his girlfriend. Patient is requesting [facility] care if needed upon discharge. JOSE EDUARDOW / JOSE to follow.   Electronically signed by KELLY Matos, JUSTA on 6/30/22 at 10:39 AM EDT

## 2022-06-30 NOTE — ANESTHESIA PRE PROCEDURE
Department of Anesthesiology  Preprocedure Note       Name:  Kostas Villanueva   Age:  74 y.o.  :  1950                                          MRN:  [id number]         Date:  2022      Surgeon: Martha Fox):  Jossy Eden MD    Procedure: Procedure(s):  AMPUTATION LEFT MIDDLE FINGER BONE CUTTERS  ROOM 480    Medications prior to admission:   Prior to Admission medications    Medication Sig Start Date End Date Taking?  Authorizing Provider   amLODIPine (NORVASC) 5 MG tablet Take 5 mg by mouth daily 22   Historical Provider, MD   atorvastatin (LIPITOR) 40 MG tablet Take 40 mg by mouth daily 22   Historical Provider, MD   amoxicillin (AMOXIL) 875 MG tablet  6/18/15   Historical Provider, MD   carvedilol (COREG) 25 MG tablet  8/5/15   Historical Provider, MD   CELEBREX 200 MG capsule  8/4/15   Historical Provider, MD   Fluticasone Propionate POWD  6/16/15   Historical Provider, MD   gabapentin (NEURONTIN) 600 MG tablet  6/18/15   Historical Provider, MD   hydrochlorothiazide (HYDRODIURIL) 25 MG tablet  7/30/15   Historical Provider, MD   HYDROcodone-acetaminophen 5-300 MG TABS  7/16/15   Historical Provider, MD   lisinopril (PRINIVIL;ZESTRIL) 40 MG tablet  8/11/15   Historical Provider, MD   VIAGRA 100 MG tablet  6/18/15   Historical Provider, MD   simvastatin (ZOCOR) 40 MG tablet  8/2/15   Historical Provider, MD       Current medications:    Current Facility-Administered Medications   Medication Dose Route Frequency Provider Last Rate Last Admin    lisinopril (PRINIVIL;ZESTRIL) tablet 40 mg  40 mg Oral Daily Khloe Meléndez MD        0.9 % sodium chloride infusion   IntraVENous Continuous Khloe Meléndez MD 75 mL/hr at 22 1027 New Bag at 22 1027    carvedilol (COREG) tablet 25 mg  25 mg Oral BID Khloe Meléndez MD   25 mg at 22 9625    sodium chloride flush 0.9 % injection 5-40 mL  5-40 mL IntraVENous 2 times per day Migel Avendano MD   10 mL at 22 0925    sodium chloride flush 0.9 % injection 5-40 mL  5-40 mL IntraVENous PRN [place], MD        0.9 % sodium chloride infusion  25 mL IntraVENous PRN [place], MD        enoxaparin (LOVENOX) injection 40 mg  40 mg SubCUTAneous Daily [place], MD        ondansetron (ZOFRAN-ODT) disintegrating tablet 4 mg  4 mg Oral Q8H PRN [place], MD        Or    ondansetron [facility]) injection 4 mg  4 mg IntraVENous Q6H PRN [place], MD        polyethylene glycol (GLYCOLAX) packet 17 g  17 g Oral Daily PRN [place], MD        acetaminophen (TYLENOL) tablet 650 mg  650 mg Oral Q6H PRN [place], MD   650 mg at 06/29/22 2234    Or    acetaminophen (TYLENOL) suppository 650 mg  650 mg Rectal Q6H PRN [place], MD        piperacillin-tazobactam (ZOSYN) 3,375 mg in dextrose 5 % 50 mL IVPB extended infusion (mini-bag)  3,375 mg IntraVENous Q8H [place], MD 12.5 mL/hr at 06/30/22 1358 3,375 mg at 06/30/22 1358       Allergies:  No Known Allergies    Problem List:    Patient Active Problem List   Diagnosis Code    Spinal stenosis, lumbar region, with neurogenic claudication M48.062    Lumbosacral spondylosis without myelopathy M47.817    Acute osteomyelitis of left hand including fingers ([facility] [address] M86.142    Open wound of middle finger S61.208A    Thermal injury T30.0       Past Medical History:        Diagnosis Date    Chronic back pain     Hypertension        Past Surgical History:        Procedure Laterality Date    SPINE SURGERY  9-21-15    LUMBAR       Social History:    Social History     Tobacco Use    Smoking status: Light Tobacco Smoker    Smokeless tobacco: Never Used   Substance Use Topics    Alcohol use: Yes     Comment: 2 a week                                Ready to quit: Not Answered  Counseling given: Not Answered      Vital Signs (Current):   Vitals:    06/29/22 1840 06/29/22 2036 06/29/22 2046 06/30/22 0922   BP: 118/69   122/87   Pulse: 69 69  77   Resp: 18   18   Temp: 98.4 °F (36.9 °C)   98.6 °F (37 °C)   TempSrc: Oral   Oral   SpO2: 100%   100%   Weight:   146 lb 12.8 oz (66.6 kg)    Height:                                                  BP Readings from Last 3 Encounters:   06/30/22 122/87   06/28/22 112/74   10/20/15 118/71       NPO Status:                                                                                 BMI:   Wt Readings from Last 3 Encounters:   06/29/22 146 lb 12.8 oz (66.6 kg)   06/28/22 149 lb (67.6 kg)   10/20/15 170 lb (77.1 kg)     Body mass index is 20.47 kg/m². CBC:   Lab Results   Component Value Date/Time    WBC 16.0 06/29/2022 06:14 AM    RBC 3.16 06/29/2022 06:14 AM    HGB 11.0 06/29/2022 06:14 AM    HCT 32.6 06/29/2022 06:14 AM    .3 06/29/2022 06:14 AM    RDW 14.6 06/29/2022 06:14 AM     06/29/2022 06:14 AM       CMP:   Lab Results   Component Value Date/Time     06/30/2022 07:28 AM    K 4.3 06/30/2022 07:28 AM    CL 93 06/30/2022 07:28 AM    CO2 22 06/30/2022 07:28 AM    BUN 6 06/30/2022 07:28 AM    CREATININE 0.68 06/30/2022 07:28 AM    GFRAA >60.0 06/30/2022 07:28 AM    LABGLOM >60.0 06/30/2022 07:28 AM    GLUCOSE 116 06/30/2022 07:28 AM    PROT 7.1 06/30/2022 07:28 AM    CALCIUM 8.3 06/30/2022 07:28 AM    BILITOT 0.5 06/30/2022 07:28 AM    ALKPHOS 128 06/30/2022 07:28 AM    AST 81 06/30/2022 07:28 AM    ALT 53 06/30/2022 07:28 AM       POC Tests: No results for input(s): POCGLU, POCNA, POCK, POCCL, POCBUN, POCHEMO, POCHCT in the last 72 hours.     Coags: No results found for: PROTIME, INR, APTT    HCG (If Applicable): No results found for: PREGTESTUR, PREGSERUM, HCG, HCGQUANT     ABGs: No results found for: PHART, PO2ART, ZZN7KLM, HMU8XGI, BEART, E5DCIJDX     Type & Screen (If Applicable):  No results found for: LABABO, LABRH    Drug/Infectious Status (If Applicable):  No results found for: HIV, HEPCAB    COVID-19 Screening (If Applicable): No results found for: COVID19        Anesthesia Evaluation    Airway: Mallampati: II  TM distance: >3 FB   Neck ROM: full  Mouth opening: > = 3 FB   Dental:          Pulmonary:Negative Pulmonary ROS breath sounds clear to auscultation                             Cardiovascular:    (+) hypertension:,       ECG reviewed  Rhythm: regular                      Neuro/Psych:   Negative Neuro/Psych ROS              GI/Hepatic/Renal: Neg GI/Hepatic/Renal ROS            Endo/Other: Negative Endo/Other ROS                    Abdominal:             Vascular: negative vascular ROS. Other Findings:           Anesthesia Plan      MAC and regional     ASA 2     (US guided Median and radial nerve block at forearm)  Induction: intravenous. MIPS: Prophylactic antiemetics administered. Anesthetic plan and risks discussed with patient. Plan discussed with CRNA.     Attending anesthesiologist reviewed and agrees with Preprocedure content      Post-op pain plan if not by surgeon: single peripheral nerve block            Cami George MD   6/30/2022

## 2022-06-30 NOTE — ANESTHESIA POSTPROCEDURE EVALUATION
Department of Anesthesiology  Postprocedure Note    Patient: Duke Lamas  MRN: [id number]  YOB: 1950  Date of evaluation: 6/30/2022      Procedure Summary     Date: 06/30/22 Room / Location: [address]    Anesthesia Start: 2388 Anesthesia Stop: 2926    Procedure: AMPUTATION LEFT MIDDLE FINGER (Left ) Diagnosis:       Finger osteomyelitis, left ([address]      (FINGER OSTEOMYELITIS LEFT)    Surgeons: Francisco Wilcox MD Responsible Provider: Mayra Chahal MD    Anesthesia Type: MAC, regional ASA Status: 2          Anesthesia Type: No value filed.     Emily Phase I: Emily Score: 10    Emily Phase II:        Anesthesia Post Evaluation    Patient location during evaluation: PACU  Patient participation: complete - patient participated  Level of consciousness: awake  Pain score: 0  Airway patency: patent  Nausea & Vomiting: no nausea and no vomiting  Complications: no  Cardiovascular status: hemodynamically stable  Respiratory status: acceptable  Hydration status: euvolemic

## 2022-06-30 NOTE — CARE COORDINATION
IV BENEFIT REQUEST FORM    [facility]                        [address] [facility]    REQUESTED BY: Electronically signed by Massimo Caldwell RN on 2022 at 11:23 AM                                               RN/C3: PHONE: [phone]    DATE:/TIME OF REQUEST: 22  TIME: 11:23 AM      TO: [address] TO: [phone]    PHONE: [phone]     THIS PATIENT HAS BEEN IDENTIFIED TO POSSIBLY NEED LONG TERM IV'S. PLEASE CHECK INSURANCE COVERAGE FOR THE FOLLOWING PT[facility]DRUGS. [facility]                              [facility] [place] [id number]   PATIENT'S : 1950  PATIENT ADDRESS: [address] [place] [address]  SSN:      PAYOR NAME:  Payor: Pablo Goldman / Plan: Perry Damon ESSENTIAL/PLUS / Product Type: *No Product type* /       Zosyn 3.375 gm IVPB every 8 hours     __________ CHECK HERE IF PT HAS NO INSURANCE AND REQUESTING SELF PAY COST. *IF [facility] IS NOT A PROVIDER FOR THIS PATIENT, PLEASE FORWARD INFO VIA FAX TO [facility] @ [phone],(PHONE NUMBER: [phone]) TO RUN BENEFIT VERIFICATION AND NOTIFY THE ABOVE C3 OF THIS PLAN. (FAX FACE SHEET WITH DEMOGRAPHICS AND INSURANCE INFO WITH THIS FORM.)  PLEASE FAX BENEFIT INFO TO: THE [phone]    This message is intended only for the use of the individual or entity to which it is addressed and may contain information that is privileged, confidential, and exempt from disclosure under applicable law. If the reader of the notice is not intended recipient of the employee/agent responsible for delivering the message to the intended recipient, you are hereby notified than any dissemination, distribution or copying of this communication is strictly prohibited. Please contact the sender for further instructions on handling the information.

## 2022-06-30 NOTE — CARE COORDINATION
Care Coordination IV Check Updates:    Patient's IV check response received with the following information provided:    \"$175.40/wk [employer] copay for drug; 80% per luz coverage\" for IV ATBs ordered as of this date 6/30. Form placed on patient's chart.
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
## 2022-06-30 NOTE — PROGRESS NOTES
Infectious Diseases Inpatient Progress Note          HISTORY OF PRESENT ILLNESS:  Follow up acute osteomyelitis left middle finger on IV Zosyn, well tolerated. Patient denies any pain or drainage. No fevers or chills. Persistent left hand swelling. Left middle dry ulcer. Going for distal amputation today. Current Medications:     lisinopril  40 mg Oral Daily    carvedilol  25 mg Oral BID    sodium chloride flush  5-40 mL IntraVENous 2 times per day    enoxaparin  40 mg SubCUTAneous Daily    piperacillin-tazobactam  3,375 mg IntraVENous Q8H       Allergies:  Patient has no known allergies. Review of Systems  14 system review is negative other than HPI    Physical Exam  Vitals:    06/29/22 1840 06/29/22 2036 06/29/22 2046 06/30/22 0922   BP: 118/69   122/87   Pulse: 69 69  77   Resp: 18   18   Temp: 98.4 °F (36.9 °C)   98.6 °F (37 °C)   TempSrc: Oral   Oral   SpO2: 100%   100%   Weight:   146 lb 12.8 oz (66.6 kg)    Height:         General Appearance: alert and oriented to person, place and time, well-developed and well-nourished, in no acute distress  Skin: warm and dry, no rash. Head: normocephalic and atraumatic  Eyes: anicteric sclerae  ENT: oropharynx clear and moist with normal mucous membranes.  No oral thrush  Lungs: normal respiratory effort  Abdomen: soft, no tenderness  No leg edema  No erythema  Left hand and middle finger with decreased swelling, positive tenderness  Left middle finger with dry ulcer at the tip  DATA:    Lab Results   Component Value Date    WBC 16.0 (H) 06/29/2022    HGB 11.0 (L) 06/29/2022    HCT 32.6 (L) 06/29/2022    .3 (H) 06/29/2022     06/29/2022     Lab Results   Component Value Date    CREATININE 0.68 (L) 06/30/2022    BUN 6 (L) 06/30/2022     (L) 06/30/2022    K 4.3 06/30/2022    CL 93 (L) 06/30/2022    CO2 22 06/30/2022       Hepatic Function Panel:  Lab Results   Component Value Date/Time    ALKPHOS 128 06/30/2022 07:28 AM    ALT 53

## 2022-07-01 VITALS
BODY MASS INDEX: 20.55 KG/M2 | TEMPERATURE: 99 F | WEIGHT: 146.8 LBS | HEART RATE: 80 BPM | RESPIRATION RATE: 16 BRPM | SYSTOLIC BLOOD PRESSURE: 135 MMHG | DIASTOLIC BLOOD PRESSURE: 85 MMHG | OXYGEN SATURATION: 100 % | HEIGHT: 71 IN

## 2022-07-01 LAB
ALBUMIN SERPL-MCNC: 2.4 G/DL (ref 3.5–4.6)
ALP BLD-CCNC: 114 U/L (ref 35–104)
ALT SERPL-CCNC: 41 U/L (ref 0–41)
ANION GAP SERPL CALCULATED.3IONS-SCNC: 13 MEQ/L (ref 9–15)
AST SERPL-CCNC: 35 U/L (ref 0–40)
BILIRUB SERPL-MCNC: 0.6 MG/DL (ref 0.2–0.7)
BUN BLDV-MCNC: 6 MG/DL (ref 8–23)
CALCIUM SERPL-MCNC: 8.6 MG/DL (ref 8.5–9.9)
CHLORIDE BLD-SCNC: 97 MEQ/L (ref 95–107)
CO2: 21 MEQ/L (ref 20–31)
CREAT SERPL-MCNC: 0.69 MG/DL (ref 0.7–1.2)
GFR AFRICAN AMERICAN: >60
GFR NON-AFRICAN AMERICAN: >60
GLOBULIN: 4.7 G/DL (ref 2.3–3.5)
GLUCOSE BLD-MCNC: 105 MG/DL (ref 70–99)
HBA1C MFR BLD: 5.4 % (ref 4.8–5.9)
MAGNESIUM: 2 MG/DL (ref 1.7–2.4)
POTASSIUM REFLEX MAGNESIUM: 3.5 MEQ/L (ref 3.4–4.9)
SODIUM BLD-SCNC: 131 MEQ/L (ref 135–144)
TOTAL PROTEIN: 7.1 G/DL (ref 6.3–8)

## 2022-07-01 PROCEDURE — 83036 HEMOGLOBIN GLYCOSYLATED A1C: CPT

## 2022-07-01 PROCEDURE — 6360000002 HC RX W HCPCS: Performed by: INTERNAL MEDICINE

## 2022-07-01 PROCEDURE — 99232 SBSQ HOSP IP/OBS MODERATE 35: CPT | Performed by: INTERNAL MEDICINE

## 2022-07-01 PROCEDURE — 1210000000 HC MED SURG R&B

## 2022-07-01 PROCEDURE — 2580000003 HC RX 258: Performed by: INTERNAL MEDICINE

## 2022-07-01 PROCEDURE — 83735 ASSAY OF MAGNESIUM: CPT

## 2022-07-01 PROCEDURE — 6370000000 HC RX 637 (ALT 250 FOR IP): Performed by: INTERNAL MEDICINE

## 2022-07-01 PROCEDURE — 36415 COLL VENOUS BLD VENIPUNCTURE: CPT

## 2022-07-01 PROCEDURE — 80053 COMPREHEN METABOLIC PANEL: CPT

## 2022-07-01 RX ADMIN — LISINOPRIL 40 MG: 20 TABLET ORAL at 09:39

## 2022-07-01 RX ADMIN — SODIUM CHLORIDE: 9 INJECTION, SOLUTION INTRAVENOUS at 21:47

## 2022-07-01 RX ADMIN — ACETAMINOPHEN 650 MG: 325 TABLET ORAL at 20:23

## 2022-07-01 RX ADMIN — PIPERACILLIN AND TAZOBACTAM 3375 MG: 3; .375 INJECTION, POWDER, LYOPHILIZED, FOR SOLUTION INTRAVENOUS at 21:42

## 2022-07-01 RX ADMIN — PIPERACILLIN AND TAZOBACTAM 3375 MG: 3; .375 INJECTION, POWDER, LYOPHILIZED, FOR SOLUTION INTRAVENOUS at 05:43

## 2022-07-01 RX ADMIN — SODIUM CHLORIDE: 9 INJECTION, SOLUTION INTRAVENOUS at 09:39

## 2022-07-01 RX ADMIN — CARVEDILOL 25 MG: 12.5 TABLET, FILM COATED ORAL at 09:39

## 2022-07-01 RX ADMIN — CARVEDILOL 25 MG: 12.5 TABLET, FILM COATED ORAL at 20:19

## 2022-07-01 RX ADMIN — PIPERACILLIN AND TAZOBACTAM 3375 MG: 3; .375 INJECTION, POWDER, LYOPHILIZED, FOR SOLUTION INTRAVENOUS at 13:39

## 2022-07-01 ASSESSMENT — PAIN DESCRIPTION - ORIENTATION
ORIENTATION: LEFT
ORIENTATION: LEFT;MID

## 2022-07-01 ASSESSMENT — ENCOUNTER SYMPTOMS
DIARRHEA: 0
COUGH: 0
GASTROINTESTINAL NEGATIVE: 1
SHORTNESS OF BREATH: 0
RESPIRATORY NEGATIVE: 1

## 2022-07-01 ASSESSMENT — PAIN - FUNCTIONAL ASSESSMENT: PAIN_FUNCTIONAL_ASSESSMENT: ACTIVITIES ARE NOT PREVENTED

## 2022-07-01 ASSESSMENT — PAIN SCALES - GENERAL
PAINLEVEL_OUTOF10: 3
PAINLEVEL_OUTOF10: 3

## 2022-07-01 ASSESSMENT — PAIN DESCRIPTION - DESCRIPTORS
DESCRIPTORS: ACHING
DESCRIPTORS: ACHING

## 2022-07-01 ASSESSMENT — PAIN DESCRIPTION - FREQUENCY: FREQUENCY: INTERMITTENT

## 2022-07-01 ASSESSMENT — PAIN DESCRIPTION - LOCATION
LOCATION: FINGER (COMMENT WHICH ONE)
LOCATION: FINGER (COMMENT WHICH ONE)

## 2022-07-01 NOTE — DISCHARGE INSTR - DIET

## 2022-07-01 NOTE — CARE COORDINATION
This LSW spoke with patient and girlfriend at bedside this afternoon. I notified patient that I am unable to reach [facility] home care after 4 attempts- patient stated that it does not matter which [address] comes to help him. He is accepting of [facility]. Referral called in to Pascual Marin at [facility]. Patient address and phone verified. I notified patient and girlfriend  that [facility] will call and schedule visit.   Electronically signed by KELLY Awan, LSW on 7/1/22 at 3:43 PM EDT
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
## 2022-07-01 NOTE — PROGRESS NOTES
Assessment completed earlier in the shift. VSS. Pt denies any pain. Lt middle finger dressing is c/d/i. NS running at 75 ml/hr. IV Zosyn given with no adverse reactions noted. LBM 6/28/2022. Call light in reach.  Electronically signed by Justin Osman RN on 7/1/2022 at 5:21 AM

## 2022-07-01 NOTE — PROGRESS NOTES
1493: Assessment completed and charted on by this RN. VSS. A&Ox4. Patient denies pain in L middle finger. Dressing is CDI. Per patient was changed this morning by orthopedic team. Patient up as tolerated and using urinal at bedside. Discharge order is in pending ID abx plan. Pt denies further needs at this time. Will continue rounds     32 61 16: Dr. Irais Cordoba saw patient but wants patient to stay one more night for cultures to finalize.      Electronically signed by Jah Salvador RN on 7/1/22 at 6:09 PM EDT

## 2022-07-01 NOTE — PROGRESS NOTES
Progress Note  Date:2022       Room:[place] [address]-  Patient Josiah Ibrahim     YOB: 1950     Age:72 y.o. Subjective    Subjective:  Symptoms:  No shortness of breath, malaise, cough, chest pain, weakness, headache, chest pressure, anorexia, diarrhea or anxiety. Review of Systems   Respiratory: Negative for cough and shortness of breath. Cardiovascular: Negative for chest pain. Gastrointestinal: Negative for anorexia and diarrhea. Neurological: Negative for weakness. Objective         Vitals Last 24 Hours:  TEMPERATURE:  Temp  Av.8 °F (37.1 °C)  Min: 98.1 °F (36.7 °C)  Max: 99.7 °F (37.6 °C)  RESPIRATIONS RANGE: Resp  Av.3  Min: 11  Max: 27  PULSE OXIMETRY RANGE: SpO2  Av.7 %  Min: 95 %  Max: 100 %  PULSE RANGE: Pulse  Av  Min: 69  Max: 91  BLOOD PRESSURE RANGE: Systolic (75XKQ), ZHT:557 , Min:91 , FKY:682   ; Diastolic (16HPW), UEU:98, Min:58, Max:91    I/O (24Hr): Intake/Output Summary (Last 24 hours) at 2022 1127  Last data filed at 2022 0941  Gross per 24 hour   Intake 1850.69 ml   Output 1400 ml   Net 450.69 ml     Objective:  General Appearance:  Comfortable, well-appearing and in no acute distress. Vital signs: (most recent): Blood pressure 129/76, pulse 73, temperature 98.4 °F (36.9 °C), temperature source Oral, resp. rate 18, height 5' 11\" (1.803 m), weight 146 lb 12.8 oz (66.6 kg), SpO2 100 %. HEENT: Normal HEENT exam.    Heart: Normal rate. Regular rhythm. Abdomen: Abdomen is soft. Bowel sounds are normal.   There is no epigastric area tenderness. Extremities: (Left hand swelling, middle finger necrotic head)  Pulses: Distal pulses are intact. Neurological: Patient is alert. Pupils:  Pupils are equal, round, and reactive to light. Skin:  Warm and dry.       Labs/Imaging/Diagnostics    Labs:  CBC:  Recent Labs     22  1159 22  0614   WBC 19.8* 16.0*   RBC 3.12* 3.16*   HGB 10.7* 11.0*   HCT 31.1* 32.6*   MCV 99.7* 103.3*   RDW 13.3 14.6*    283     CHEMISTRIES:  Recent Labs     06/29/22 0614 06/30/22 0728 07/01/22 0613   * 129* 131*   K 3.0* 4.3 3.5   CL 94* 93* 97   CO2 27 22 21   BUN 8 6* 6*   CREATININE 0.68* 0.68* 0.69*   GLUCOSE 127* 116* 105*   MG 1.8  --  2.0     PT/INR:No results for input(s): PROTIME, INR in the last 72 hours. APTT:No results for input(s): APTT in the last 72 hours. LIVER PROFILE:  Recent Labs     06/29/22 0614 06/30/22 0728 07/01/22 0613   AST 67* 81* 35   ALT 41 53* 41   BILITOT 0.6 0.5 0.6   ALKPHOS 130* 128* 114*       Imaging Last 24 Hours:  XR FINGER LEFT (MIN 2 VIEWS)    Result Date: 6/28/2022  EXAMINATION: Left third  CLINICAL HISTORY: Swelling. COMPARISON: None  FINDINGS: Three views of the Left/Right finger a are submitted. There is diffuse swelling of the left third finger. There is a diffuse osteolysis of the left of the left third finger. This is best appreciated on the lateral view. No dislocations. No focal bony abnormalities                                                                                   DIFFUSE OSTEOLYSIS OF THE LEFT OF THE LEFT THIRD FINGER. THIS MAY BE COMPATIBLE GIVEN PATIENT'S HISTORY OF THERMAL INJURY WITH ASSOCIATED OSTEOMYELITIS. Assessment//Plan           Hospital Problems           Last Modified POA    * (Principal) Acute osteomyelitis of left hand including fingers ([address] 6/29/2022 Yes    Open wound of middle finger 6/29/2022 Yes    Thermal injury 6/29/2022 Yes    Finger osteomyelitis, left ([address] 6/30/2022 Yes      HTN  HLD  hypokalemia  Assessment & Plan    6/29: Follow-up orthopedic surgery about osteomyelitis of the left middle finger. Continue IV antibiotics. Resume home medications. Spoke with nursing. Replace potassium. Magnesium within normal limit. 6/30: Today is patient's birthday. Patient going for amputation of the left middle finger. Continue IV antibiotics. Patient is on Coreg and lisinopril. Does not take Norvasc. Will DC Norvasc for now. Spoke with nursing about the care. 7/1: Patient medically stable to be discharged. No overnight events no new complaints. Continue current care. abx as per ID, need ID clearance.   Electronically signed by Grover Bran MD on 6/29/22 at 10:35 AM EDT

## 2022-07-01 NOTE — PROGRESS NOTES
Progress Note  Patient: Zarina Garsia  Unit/Bed: T762/L827-21  YOB: 1950  MRN: [id number]  Acct: [de-identified]   Admitting Diagnosis: Finger osteomyelitis [facility]) [M86.9]  Date:  6/28/2022  Hospital Day: 3    Chief Complaint:  Left middle finger OM    Patient denies any pain to left middle finger when it was amputated at DIP joint. Physical Examination:    /76   Pulse 73   Temp 98.4 °F (36.9 °C) (Oral)   Resp 18   Ht 5' 11\" (1.803 m)   Wt 146 lb 12.8 oz (66.6 kg)   SpO2 100%   BMI 20.47 kg/m²    Physical Exam    Surgical dressing taken down and incision visualized. The incision is well approximated, no erythema, drainage or swelling noted. Patient has sensation to left middle finger past the area of amputation.      LABS:  CBC:   Lab Results   Component Value Date/Time    WBC 16.0 06/29/2022 06:14 AM    RBC 3.16 06/29/2022 06:14 AM    HGB 11.0 06/29/2022 06:14 AM    HCT 32.6 06/29/2022 06:14 AM    .3 06/29/2022 06:14 AM    MCH 34.8 06/29/2022 06:14 AM    MCHC 33.6 06/29/2022 06:14 AM    RDW 14.6 06/29/2022 06:14 AM     06/29/2022 06:14 AM    MPV 8.6 12/12/2013 09:22 AM     CBC with Differential:   Lab Results   Component Value Date/Time    WBC 16.0 06/29/2022 06:14 AM    RBC 3.16 06/29/2022 06:14 AM    HGB 11.0 06/29/2022 06:14 AM    HCT 32.6 06/29/2022 06:14 AM     06/29/2022 06:14 AM    .3 06/29/2022 06:14 AM    MCH 34.8 06/29/2022 06:14 AM    MCHC 33.6 06/29/2022 06:14 AM    RDW 14.6 06/29/2022 06:14 AM    BANDSPCT 1 06/29/2022 06:14 AM    LYMPHOPCT 1.0 06/29/2022 06:14 AM    MONOPCT 12.1 06/29/2022 06:14 AM    BASOPCT 0.3 06/29/2022 06:14 AM    MONOSABS 1.9 06/29/2022 06:14 AM    LYMPHSABS 0.2 06/29/2022 06:14 AM    EOSABS 0.0 06/29/2022 06:14 AM    BASOSABS 0.0 06/29/2022 06:14 AM     CMP:    Lab Results   Component Value Date/Time     07/01/2022 06:13 AM    K 3.5 07/01/2022 06:13 AM    CL 97 07/01/2022 06:13 AM    CO2 21 07/01/2022 06:13 AM    BUN

## 2022-07-02 LAB
ALBUMIN SERPL-MCNC: 2.4 G/DL (ref 3.5–4.6)
ALP BLD-CCNC: 102 U/L (ref 35–104)
ALT SERPL-CCNC: 35 U/L (ref 0–41)
ANION GAP SERPL CALCULATED.3IONS-SCNC: 12 MEQ/L (ref 9–15)
AST SERPL-CCNC: 30 U/L (ref 0–40)
BILIRUB SERPL-MCNC: 0.5 MG/DL (ref 0.2–0.7)
BUN BLDV-MCNC: 4 MG/DL (ref 8–23)
CALCIUM SERPL-MCNC: 8.1 MG/DL (ref 8.5–9.9)
CHLORIDE BLD-SCNC: 101 MEQ/L (ref 95–107)
CO2: 22 MEQ/L (ref 20–31)
CREAT SERPL-MCNC: 0.7 MG/DL (ref 0.7–1.2)
GFR AFRICAN AMERICAN: >60
GFR NON-AFRICAN AMERICAN: >60
GLOBULIN: 4.3 G/DL (ref 2.3–3.5)
GLUCOSE BLD-MCNC: 106 MG/DL (ref 70–99)
MAGNESIUM: 1.9 MG/DL (ref 1.7–2.4)
POTASSIUM REFLEX MAGNESIUM: 3.4 MEQ/L (ref 3.4–4.9)
SODIUM BLD-SCNC: 135 MEQ/L (ref 135–144)
TOTAL PROTEIN: 6.7 G/DL (ref 6.3–8)

## 2022-07-02 PROCEDURE — 6370000000 HC RX 637 (ALT 250 FOR IP): Performed by: INTERNAL MEDICINE

## 2022-07-02 PROCEDURE — 36415 COLL VENOUS BLD VENIPUNCTURE: CPT

## 2022-07-02 PROCEDURE — 80053 COMPREHEN METABOLIC PANEL: CPT

## 2022-07-02 PROCEDURE — 2580000003 HC RX 258: Performed by: INTERNAL MEDICINE

## 2022-07-02 PROCEDURE — 6360000002 HC RX W HCPCS: Performed by: INTERNAL MEDICINE

## 2022-07-02 PROCEDURE — 83735 ASSAY OF MAGNESIUM: CPT

## 2022-07-02 RX ORDER — CEPHALEXIN 500 MG/1
500 CAPSULE ORAL 3 TIMES DAILY
Qty: 21 CAPSULE | Refills: 0 | Status: SHIPPED | OUTPATIENT
Start: 2022-07-02 | End: 2022-07-09

## 2022-07-02 RX ADMIN — PIPERACILLIN AND TAZOBACTAM 3375 MG: 3; .375 INJECTION, POWDER, LYOPHILIZED, FOR SOLUTION INTRAVENOUS at 05:31

## 2022-07-02 RX ADMIN — ACETAMINOPHEN 650 MG: 325 TABLET ORAL at 08:42

## 2022-07-02 RX ADMIN — CARVEDILOL 25 MG: 12.5 TABLET, FILM COATED ORAL at 08:42

## 2022-07-02 RX ADMIN — Medication 10 ML: at 08:44

## 2022-07-02 RX ADMIN — LISINOPRIL 40 MG: 20 TABLET ORAL at 08:43

## 2022-07-02 ASSESSMENT — PAIN DESCRIPTION - ORIENTATION: ORIENTATION: RIGHT

## 2022-07-02 ASSESSMENT — PAIN DESCRIPTION - DESCRIPTORS: DESCRIPTORS: ACHING

## 2022-07-02 ASSESSMENT — PAIN SCALES - GENERAL
PAINLEVEL_OUTOF10: 0
PAINLEVEL_OUTOF10: 3

## 2022-07-02 ASSESSMENT — PAIN DESCRIPTION - LOCATION: LOCATION: HAND

## 2022-07-02 NOTE — FLOWSHEET NOTE
Patient discharged. IV removed. Telemetry monitor removed and sent back to [facility]. Discharge paperwork reviewed with the patient. Aware of antibiotics and medication orders. Patient thankful to go home. Bathed and cleaned self up prior to leaving. Belongings gathered. Patient left with GF at [phone] via w/c. Home health called and confirmed patient was going home today.

## 2022-07-02 NOTE — PROGRESS NOTES
Assessment documented. A + O x 4. C/O 3/10 scale pain to left middle finger and medicated with prn apap with (+) results. Dressing is clean, dry and intact. No other needs offered at this time. Call light within rech.     /85   Pulse 80   Temp 99 °F (37.2 °C) (Oral)   Resp 16   Ht 5' 11\" (1.803 m)   Wt 146 lb 12.8 oz (66.6 kg)   SpO2 100%   BMI 20.47 kg/m²

## 2022-07-02 NOTE — CARE COORDINATION
This LSW met with patient at bedside this am. Patient resting comfortably. Patient is anticipating discharge home today, 7/2/2022. Patient will be followed by [facility]. Girlfriend of patient will provide transportation home and will assist with care.   Electronically signed by KELLY Ohara, LSW on 7/2/22 at 9:44 AM EDT

## 2022-07-02 NOTE — DISCHARGE SUMMARY
Discharge Summary    Date: 7/2/2022  Patient Name: Paulette Lu YOB: 1950 Age: 74 y.o. Admit Date: 6/28/2022  Discharge Date: 7/2/2022  Discharge Condition: Cindra Romberg    Admission Diagnosis  Finger osteomyelitis ([facility] (M86.9)     Discharge Diagnosis  Principal Problem: Acute osteomyelitis of left hand including fingers (HCC)Active Problems: Open wound of middle finger Thermal injury Finger osteomyelitis, left (HCC)Resolved Problems: * No resolved hospital problems. [facility] Stay  Narrative of Hospital Course:  Patient comes in for middle finger infection/osteomyelitis status post amputation. Left middle finger primary amputation through distal phalanx with primary closure. Received IV antibiotics here can be discharged on p.o. antibiotics. Awaiting for final antibiotic recommendation by ID. Consultants:  IP CONSULT TO INFECTIOUS DISEASESIP CONSULT TO ORTHOPEDIC SURGERYIP CONSULT TO HOME CARE NEEDS    Surgeries/procedures Performed:       Treatments:    Antibiotics    Meropenem    Discharge Plan/Disposition:  Home    Hospital/Incidental Findings Requiring Follow Up:    Patient Instructions:    Diet:    Activity:  For number of days (if applicable): Other Instructions:    Provider Follow-Up:   No follow-ups on file.      Significant Diagnostic Studies:    Recent Labs:  Admission on 06/28/2022Sodium                                        Date: 06/29/2022Value: 133*        Ref range: 135 - 144 mEq/L    Status: FinalPotassium reflex Magnesium                    Date: 06/29/2022Value: 3.0*        Ref range: 3.4 - 4.9 mEq/L    Status: FinalChloride                                      Date: 06/29/2022Value: 94*         Ref range: 95 - 107 mEq/L     Status: FinalCO2                                           Date: 06/29/2022Value: 27          Ref range: 20 - 31 mEq/L      Status: FinalAnion Gap                                     Date: 06/29/2022Value: 12          Ref range: 9 - 15 mEq/L Date: 06/29/2022Value: 16.0*       Ref range: 4.8 - 10.8 K/uL    Status: FinalRBC                                           Date: 06/29/2022Value: 3.16*       Ref range: 4.70 - 6.10 M/uL   Status: FinalHemoglobin                                    Date: 06/29/2022Value: 11.0*       Ref range: 14.0 - 18.0 g/dL   Status: FinalHematocrit                                    Date: 06/29/2022Value: 32.6*       Ref range: 42.0 - 52.0 %      Status: FinalMCV                                           Date: 06/29/2022Value: 103.3*      Ref range: 80.0 - 100.0 fL    Status: [address]                                           Date: 06/29/2022Value: 34.8*       Ref range: 27.0 - 31.3 pg     Status: [address]                                          Date: 06/29/2022Value: 33.6        Ref range: 33.0 - 37.0 %      Status: FinalRDW                                           Date: 06/29/2022Value: 14.6*       Ref range: 11.5 - 14.5 %      Status: FinalPlatelets                                     Date: 06/29/2022Value: 283         Ref range: 130 - 400 K/uL     Status: FinalPLATELET SLIDE REVIEW                         Date: 06/29/2022Value: Normal        Status: FinalNeutrophils %                                 Date: 06/29/2022Value: 86.0        Ref range: %                  Status: FinalLymphocytes %                                 Date: 06/29/2022Value: 1.0         Ref range: %                  Status: FinalMonocytes %                                   Date: 06/29/2022Value: 12.1        Ref range: %                  Status: FinalEosinophils %                                 Date: 06/29/2022Value: 0.1         Ref range: %                  Status: FinalBasophils %                                   Date: 06/29/2022Value: 0.3         Ref range: %                  Status: FinalNeutrophils Absolute                          Date: 06/29/2022Value: 13.9*       Ref range: 1.4 - 6.5 K/uL     Status: Status: Final              Comment: >60 mL/min/1.73m2 EGFR, calc. for ages 25 and older using theMDRD formula (not corrected for weight), is valid for stablerenal function. GFR                           Date: 06/30/2022Value: >60.0       Ref range: >60                Status: Final              Comment: >60 mL/min/1.73m2 EGFR, calc. for ages 25 and older using theMDRD formula (not corrected for weight), is valid for stablerenal function. Calcium                                       Date: 06/30/2022Value: 8.3*        Ref range: 8.5 - 9.9 mg/dL    Status: FinalTotal Protein                                 Date: 06/30/2022Value: 7.1         Ref range: 6.3 - 8.0 g/dL     Status: FinalAlbumin                                       Date: 06/30/2022Value: 2.4*        Ref range: 3.5 - 4.6 g/dL     Status: FinalTotal Bilirubin                               Date: 06/30/2022Value: 0.5         Ref range: 0.2 - 0.7 mg/dL    Status: FinalAlkaline Phosphatase                          Date: 06/30/2022Value: 128*        Ref range: 35 - 104 U/L       Status: FinalALT                                           Date: 06/30/2022Value: 53*         Ref range: 0 - 41 U/L         Status: Final              Comment: Specimen hemolysis has exceeded the interference as definedby Roche. Result may be affected. Suggest recollection ifclinically indicated. AST                                           Date: 06/30/2022Value: 81*         Ref range: 0 - 40 U/L         Status: Final              Comment: Specimen hemolysis has exceeded the interference as definedby Roche. Value may be falsely increased. Suggestrecollection if clinically indicated. Globulin                                      Date: 06/30/2022Value: 4.7*        Ref range: 2.3 - 3.5 g/dL     Status: FinalHemoglobin A1C                                Date: 06/30/2022Value: 5.3         Ref range: 4.8 - 5.9 %        Status: FinalCulture Surgical Date: 06/30/2022Value:               Status: Preliminary                 Value:Direct Exam:  NO NEUTROPHILS SEENDirect Exam:  NO BACTERIA SEENCulture, Surgical:  SPECIMEN RECEIVEDPerformed at [facility], [address] [place] [address] Drive [phone]                                        Date: 07/01/2022Value: 131*        Ref range: 135 - 144 mEq/L    Status: FinalPotassium reflex Magnesium                    Date: 07/01/2022Value: 3.5         Ref range: 3.4 - 4.9 mEq/L    Status: FinalChloride                                      Date: 07/01/2022Value: 97          Ref range: 95 - 107 mEq/L     Status: FinalCO2                                           Date: 07/01/2022Value: 21          Ref range: 20 - 31 mEq/L      Status: FinalAnion Gap                                     Date: 07/01/2022Value: 13          Ref range: 9 - 15 mEq/L       Status: FinalGlucose                                       Date: 07/01/2022Value: 105*        Ref range: 70 - 99 mg/dL      Status: FinalBUN                                           Date: 07/01/2022Value: 6*          Ref range: 8 - 23 mg/dL       Status: FinalCREATININE                                    Date: 07/01/2022Value: 0.69*       Ref range: 0.70 - 1.20 mg/dL  Status: FinalGFR Non-                      Date: 07/01/2022Value: >60.0       Ref range: >60                Status: Final              Comment: >60 mL/min/1.73m2 EGFR, calc. for ages 25 and older using theMDRD formula (not corrected for weight), is valid for stablerenal function. GFR                           Date: 07/01/2022Value: >60.0       Ref range: >60                Status: Final              Comment: >60 mL/min/1.73m2 EGFR, calc. for ages 25 and older using theMDRD formula (not corrected for weight), is valid for stablerenal function. Calcium                                       Date: 07/01/2022Value: 8.6         Ref range: 8.5 - 9.9 mg/dL    Status: FinalTotal Protein                                 Date: 07/01/2022Value: 7.1         Ref range: 6.3 - 8.0 g/dL     Status: FinalAlbumin                                       Date: 07/01/2022Value: 2.4*        Ref range: 3.5 - 4.6 g/dL     Status: FinalTotal Bilirubin                               Date: 07/01/2022Value: 0.6         Ref range: 0.2 - 0.7 mg/dL    Status: FinalAlkaline Phosphatase                          Date: 07/01/2022Value: 114*        Ref range: 35 - 104 U/L       Status: FinalALT                                           Date: 07/01/2022Value: 41          Ref range: 0 - 41 U/L         Status: FinalAST                                           Date: 07/01/2022Value: 35          Ref range: 0 - 40 U/L         Status: Final              Comment: Specimen hemolysis has exceeded the interference as definedby Roche. Value may be falsely increased. Suggestrecollection if clinically indicated. Globulin                                      Date: 07/01/2022Value: 4.7*        Ref range: 2.3 - 3.5 g/dL     Status: FinalHemoglobin A1C                                Date: 07/01/2022Value: 5.4         Ref range: 4.8 - 5.9 %        Status: FinalMagnesium                                     Date: 07/01/2022Value: 2.0         Ref range: 1.7 - 2.4 mg/dL    Status: FinalSodium                                        Date: 07/02/2022Value: 135         Ref range: 135 - 144 mEq/L    Status: FinalPotassium reflex Magnesium                    Date: 07/02/2022Value: 3.4         Ref range: 3.4 - 4.9 mEq/L    Status: FinalChloride                                      Date: 07/02/2022Value: 101         Ref range: 95 - 107 mEq/L     Status: FinalCO2                                           Date: 07/02/2022Value: 22          Ref range: 20 - 31 mEq/L      Status: FinalAnion Gap                                     Date: 07/02/2022Value: 12          Ref range: 9 - 15 mEq/L       Status: FinalGlucose Date: 07/02/2022Value: 106*        Ref range: 70 - 99 mg/dL      Status: FinalBUN                                           Date: 07/02/2022Value: 4*          Ref range: 8 - 23 mg/dL       Status: FinalCREATININE                                    Date: 07/02/2022Value: 0.70        Ref range: 0.70 - 1.20 mg/dL  Status: FinalGFR Non-                      Date: 07/02/2022Value: >60.0       Ref range: >60                Status: Final              Comment: >60 mL/min/1.73m2 EGFR, calc. for ages 25 and older using theMDRD formula (not corrected for weight), is valid for stablerenal function. GFR                           Date: 07/02/2022Value: >60.0       Ref range: >60                Status: Final              Comment: >60 mL/min/1.73m2 EGFR, calc. for ages 25 and older using theMDRD formula (not corrected for weight), is valid for stablerenal function. Calcium                                       Date: 07/02/2022Value: 8.1*        Ref range: 8.5 - 9.9 mg/dL    Status: FinalTotal Protein                                 Date: 07/02/2022Value: 6.7         Ref range: 6.3 - 8.0 g/dL     Status: FinalAlbumin                                       Date: 07/02/2022Value: 2.4*        Ref range: 3.5 - 4.6 g/dL     Status: FinalTotal Bilirubin                               Date: 07/02/2022Value: 0.5         Ref range: 0.2 - 0.7 mg/dL    Status: FinalAlkaline Phosphatase                          Date: 07/02/2022Value: 102         Ref range: 35 - 104 U/L       Status: FinalALT                                           Date: 07/02/2022Value: 35          Ref range: 0 - 41 U/L         Status: FinalAST                                           Date: 07/02/2022Value: 30          Ref range: 0 - 40 U/L         Status: FinalGlobulin                                      Date: 07/02/2022Value: 4.3*        Ref range: 2.3 - 3.5 g/dL     Status: Final------------    Radiology last 7 days:  XR FINGER LEFT (MIN 2 VIEWS)Result Date: 6/28/2022DIFFUSE OSTEOLYSIS OF THE LEFT OF THE LEFT THIRD FINGER. THIS MAY BE COMPATIBLE GIVEN PATIENT'S HISTORY OF THERMAL INJURY WITH ASSOCIATED OSTEOMYELITIS. Pending Labs   Order Current Status  Surgical Pathology Collected (06/30/22 1299)  Magnesium In process  Surgical Pathology In process  Culture, Surgical Preliminary result      Discharge Medications    Current Discharge Medication List    Current Discharge Medication List    Current Discharge Medication ListCONTINUE these medications which have NOT CHANGEDcarvedilol (COREG) 25 MG tabletRefills: 3lisinopril (PRINIVIL;ZESTRIL) 40 MG tabletRefills: 3VIAGRA 100 MG tabletRefills: 3simvastatin (ZOCOR) 40 MG tabletRefills: 3    Current Discharge Medication ListSTOP taking these medicationsamLODIPine (NORVASC) 5 MG tabletComments:Reason for Stopping:atorvastatin (LIPITOR) 40 MG tabletComments:Reason for Stopping:amoxicillin (AMOXIL) 875 MG tabletComments:Reason for Stopping:CELEBREX 200 MG capsuleComments:Reason for Stopping:Fluticasone Propionate POWDComments:Reason for Stopping:gabapentin (NEURONTIN) 600 MG tabletComments:Reason for Stopping:hydrochlorothiazide (HYDRODIURIL) 25 MG tabletComments:Reason for Stopping:HYDROcodone-acetaminophen 5-300 MG TABSComments:Reason for Stopping:    Time Spent on Discharge:E] minutes were spent in patient examination, evaluation, counseling as well as medication reconciliation, prescriptions for required medications, discharge plan, and follow up.     Electronically signed by Liu Barrett MD on 7/2/22 at 9:22 AM EDT   overtime on dc summary was 45 min

## 2022-07-03 LAB
BLOOD CULTURE, ROUTINE: NORMAL
CULTURE, BLOOD 2: NORMAL

## 2022-07-05 LAB — CULTURE SURGICAL: NORMAL

## 2022-07-06 LAB
EKG ATRIAL RATE: 74 BPM
EKG P AXIS: 52 DEGREES
EKG P-R INTERVAL: 126 MS
EKG Q-T INTERVAL: 430 MS
EKG QRS DURATION: 78 MS
EKG QTC CALCULATION (BAZETT): 477 MS
EKG R AXIS: 12 DEGREES
EKG T AXIS: 11 DEGREES
EKG VENTRICULAR RATE: 74 BPM

## 2022-08-03 ENCOUNTER — OFFICE VISIT (OUTPATIENT)
Dept: ORTHOPEDIC SURGERY | Age: 72
End: 2022-08-03
Payer: MEDICARE

## 2022-08-03 VITALS — HEIGHT: 71 IN | WEIGHT: 146 LBS | OXYGEN SATURATION: 95 % | BODY MASS INDEX: 20.44 KG/M2 | HEART RATE: 70 BPM

## 2022-08-03 DIAGNOSIS — M86.142 ACUTE OSTEOMYELITIS OF LEFT HAND INCLUDING FINGERS (HCC): ICD-10-CM

## 2022-08-03 DIAGNOSIS — M25.642 STIFFNESS OF FINGER JOINT, LEFT: Primary | ICD-10-CM

## 2022-08-03 PROCEDURE — 1123F ACP DISCUSS/DSCN MKR DOCD: CPT | Performed by: PHYSICIAN ASSISTANT

## 2022-08-03 PROCEDURE — 99203 OFFICE O/P NEW LOW 30 MIN: CPT | Performed by: PHYSICIAN ASSISTANT

## 2022-08-03 NOTE — PROGRESS NOTES
[facility]      Subjective:      Chief Complaint   Patient presents with    Post-Op Check     AMPUTATION LEFT MIDDLE FINGER       HPI: Ashley Castro is a 74 y.o. male who is here little over a month after an amputation of the left middle finger for osteomyelitis with associated ischemia. Past Medical History:   Diagnosis Date    Chronic back pain     Hypertension       Past Surgical History:   Procedure Laterality Date    FINGER AMPUTATION Left 6/30/2022    AMPUTATION LEFT MIDDLE FINGER performed by Brian Palma MD at . [place] [address]  9-21-15    LUMBAR     Social History     Socioeconomic History    Marital status: Single     Spouse name: Not on file    Number of children: Not on file    Years of education: Not on file    Highest education level: Not on file   Occupational History    Not on file   Tobacco Use    Smoking status: Light Smoker    Smokeless tobacco: Never   Substance and Sexual Activity    Alcohol use: Yes     Comment: 2 a week    Drug use: No    Sexual activity: Not on file   Other Topics Concern    Not on file   Social History Narrative    Not on file     Social Determinants of Health     Financial Resource Strain: Not on file   Food Insecurity: Not on file   Transportation Needs: Not on file   Physical Activity: Not on file   Stress: Not on file   Social Connections: Not on file   Intimate Partner Violence: Not on file   Housing Stability: Not on file     Family History   Problem Relation Age of Onset    High Blood Pressure Mother      No Known Allergies  Current Outpatient Medications on File Prior to Visit   Medication Sig Dispense Refill    carvedilol (COREG) 25 MG tablet   3    lisinopril (PRINIVIL;ZESTRIL) 40 MG tablet   3    VIAGRA 100 MG tablet   3    simvastatin (ZOCOR) 40 MG tablet   3     No current facility-administered medications on file prior to visit.        Objective:   Pulse 70   Ht 5' 11\" (1.803 m)   Wt 146 lb (66.2 kg) SpO2 95%   BMI 20.36 kg/m²       Radiographs and Laboratory Studies:   Previous diagnostic imaging studies were reviewed. Exam of the left hand demonstrates a well-healed amputation of the DIPJ of the middle finger. He has no active or passive tenodesis effect with flexion. His inability to make a fist with the fourth and fifth digits. No tenderness over the A1 pulley. Tinel tap at the ulnar nerve. Laboratory Studies:   Lab Results   Component Value Date    WBC 16.0 (H) 06/29/2022    HGB 11.0 (L) 06/29/2022    HCT 32.6 (L) 06/29/2022    .3 (H) 06/29/2022     06/29/2022     No results found for: SEDRATE  No results found for: CRP    Assessment and Plan:      Diagnosis Orders   1. Stiffness of finger joint, left  EMG      2. Acute osteomyelitis of left hand including fingers (HCC)            1 month after an amputation of the left middle finger for osteomyelitis with associated ischemia. His wound is healed nicely. He is finished with his antibiotics. He is here for a different problem now. He has inability to have any flexion whatsoever at the fourth and fifth digits at the same hand. There was no trauma to the area. Is unsure when this exactly started however This was not there prior to surgery. There is no associated neck pain no radiculopathy-like symptoms. He has full sensation of the fourth and fifth digits. Concern for flexor tendon is very low as there was no physical trauma. This could be more related to a nerve injury however given that his sensation is intact that is even of question. Therefore not an EMG was ordered urgently, we will also have him follow-up with Dr. Edilberto Andres in the next week or 2 to see what he thinks. The above plan was discussed in thorough detail with Dr. Lance Estrada and the patient. No orders of the defined types were placed in this encounter. No orders of the defined types were placed in this encounter. No follow-ups on file.     [employer] [place], [address] [facility] and [address] [facility]

## 2022-08-04 ENCOUNTER — HOSPITAL ENCOUNTER (OUTPATIENT)
Dept: NEUROLOGY | Age: 72
Discharge: HOME | End: 2022-08-04
Payer: MEDICARE

## 2022-08-04 PROCEDURE — 95913 NRV CNDJ TEST 13/> STUDIES: CPT | Performed by: PSYCHIATRY & NEUROLOGY

## 2022-08-04 PROCEDURE — 95886 MUSC TEST DONE W/N TEST COMP: CPT | Performed by: PSYCHIATRY & NEUROLOGY

## 2022-08-04 PROCEDURE — 95886 MUSC TEST DONE W/N TEST COMP: CPT

## 2022-08-04 PROCEDURE — 95911 NRV CNDJ TEST 9-10 STUDIES: CPT

## 2022-08-04 NOTE — PROCEDURES
[address]                      [address] [place], [address] [facility]                             ELECTROMYOGRAM REPORT    PATIENT NAME: Ameya Hayes                    :        1950  MED REC NO:   [id number]                            ROOM:  ACCOUNT NO:   [de-identified]                           ADMIT DATE: 2022  PROVIDER:     Cody Valdez MD    DATE OF EM2022    REASON FOR STUDY:  Neurophysiological studies are requested for the  patient's numbness in the left hand. FINDINGS:  MOTOR NERVE CONDUCTION STUDIES:  Motor nerve conduction study of the  right median nerve shows normal peak latencies, amplitudes, and  conduction velocity. Motor nerve conduction study of the right ulnar  nerve shows normal amplitudes and latency and decreased conduction  velocity. Left median nerve motor nerve conduction study shows normal  peak latencies, low amplitudes, and normal conduction velocity. Left  ulnar nerve motor nerve conduction study shows normal peak latency, low  amplitudes, and normal conduction velocity. SENSORY NERVE CONDUCTION STUDIES:  Sensory nerve conduction study of the  left median nerve recorded in digit 2 shows significantly prolonged  latencies, low amplitudes, and decreased conduction velocity. Further  mid palm stimulation is obtained and not recordable. The right median  digit 2 examination shows prolonged latency, low amplitudes, and  decreased conduction velocity. Further mid palm stimulation was  obtained to confirm findings and was not recordable. The right ulnar  digit 2 examination shows normal peak latencies, amplitudes, and  conduction velocity. The left ulnar digit 2 examination shows normal  peak latency, low amplitudes, and normal conduction velocity. The right  ulnar mixed orthodromic study shows normal peak latency, low amplitudes,  and normal conduction velocity.   The left ulnar mixed orthodromic study  shows normal peak latencies, low amplitudes, and mildly decreased  conduction velocity. The radial sensory nerve conduction studies are  normal.    Needle electrode examination of the above-sampled muscles shows discrete  units seen in the abductor pollicis brevis muscle on the left and  decreased motor units on the _____ abductor pollicis brevis as well. A  single positive wave in the left first dorsal interosseous muscle. IMPRESSION:  Severe bilateral median nerve neuropathies at the wrists  suggesting carpal tunnel syndrome. These findings are based on  significantly prolonged latencies seen in the digit 2 examination and  absence of bilateral palm stimulations. The ulnar nerve conduction  studies are essentially normal.  Significant arthritic changes are  notable in the hands. No definite active or chronic radiculopathies are  noted. Multiple sensory nerve conduction studies are evaluated to avoid any  artifact of temperature differences. This test is personally performed and interpreted by me.         Samm Clark MD    D: 08/04/2022 9:37:05       T: 08/04/2022 9:39:21     [email or web address]  Job#: [id number]     Doc#: [id number]    CC:

## 2022-08-22 PROBLEM — G56.03 BILATERAL CARPAL TUNNEL SYNDROME: Status: ACTIVE | Noted: 2022-08-22

## 2022-08-24 ENCOUNTER — OFFICE VISIT (OUTPATIENT)
Dept: ORTHOPEDIC SURGERY | Age: 72
End: 2022-08-24
Payer: MEDICARE

## 2022-08-24 VITALS
BODY MASS INDEX: 20.44 KG/M2 | WEIGHT: 146 LBS | HEIGHT: 71 IN | TEMPERATURE: 97.5 F | HEART RATE: 68 BPM | OXYGEN SATURATION: 99 %

## 2022-08-24 DIAGNOSIS — M24.549 CONTRACTURE OF HAND: Primary | ICD-10-CM

## 2022-08-24 PROCEDURE — 1123F ACP DISCUSS/DSCN MKR DOCD: CPT | Performed by: ORTHOPAEDIC SURGERY

## 2022-08-24 PROCEDURE — 99213 OFFICE O/P EST LOW 20 MIN: CPT | Performed by: ORTHOPAEDIC SURGERY

## 2022-08-24 NOTE — PROGRESS NOTES
Subjective:      Patient ID: Author Neil is a 74 y.o. male who presents today for:  Chief Complaint   Patient presents with    Follow-up     Bilateral hand pain EMG results, DR Hillary Ramsay AMPUTATION LEFT MIDDLE FINGER on        HPI    Patient comes in in regards to his hand. He is recall he did osteomyelitis he had to have a middle finger partially amputated. As a result of that and the associated conditions he had he has developed a little quad regio effect in regards to his hand in regards to flexion of his fingers his fingers are extremely stiff not only cannot bring it down actively but passively can bring it down as well. Be my expectation that actively he will lose little motion but not nearly the amount he has now. [address]                       [address] [facility], [address] [facility]                              ELECTROMYOGRAM REPORT     PATIENT NAME: Griselda Muller                    :        1950  MED REC NO:   [id number]                            ROOM:  ACCOUNT NO:   [de-identified]                           ADMIT DATE: 2022  PROVIDER:     Juan Pablo Barnhart MD     DATE OF EM2022     REASON FOR STUDY:  Neurophysiological studies are requested for the  patient's numbness in the left hand. FINDINGS:  MOTOR NERVE CONDUCTION STUDIES:  Motor nerve conduction study of the  right median nerve shows normal peak latencies, amplitudes, and  conduction velocity. Motor nerve conduction study of the right ulnar  nerve shows normal amplitudes and latency and decreased conduction  velocity. Left median nerve motor nerve conduction study shows normal  peak latencies, low amplitudes, and normal conduction velocity. Left  ulnar nerve motor nerve conduction study shows normal peak latency, low  amplitudes, and normal conduction velocity.      SENSORY NERVE CONDUCTION STUDIES:  Sensory nerve conduction study of the  left median nerve recorded in digit 2 shows significantly Details     View Encounter     Lab and Collection Details     Routing     Result History               Past Medical History:   Diagnosis Date    Chronic back pain     Hypertension      Past Surgical History:   Procedure Laterality Date    FINGER AMPUTATION Left 6/30/2022    AMPUTATION LEFT MIDDLE FINGER performed by Blaze Aj MD at . [facility] [address]  9-21-15    LUMBAR     Social History     Socioeconomic History    Marital status: Single     Spouse name: Not on file    Number of children: Not on file    Years of education: Not on file    Highest education level: Not on file   Occupational History    Not on file   Tobacco Use    Smoking status: Never    Smokeless tobacco: Never   Vaping Use    Vaping Use: Unknown   Substance and Sexual Activity    Alcohol use: Yes     Comment: 2 a week    Drug use: No    Sexual activity: Not on file   Other Topics Concern    Not on file   Social History Narrative    Not on file     Social Determinants of Health     Financial Resource Strain: Not on file   Food Insecurity: Not on file   Transportation Needs: Not on file   Physical Activity: Not on file   Stress: Not on file   Social Connections: Not on file   Intimate Partner Violence: Not on file   Housing Stability: Not on file     Family History   Problem Relation Age of Onset    High Blood Pressure Mother      No Known Allergies  Current Outpatient Medications on File Prior to Visit   Medication Sig Dispense Refill    carvedilol (COREG) 25 MG tablet   3    lisinopril (PRINIVIL;ZESTRIL) 40 MG tablet   3    VIAGRA 100 MG tablet   3    simvastatin (ZOCOR) 40 MG tablet   3     No current facility-administered medications on file prior to visit.           Review of Systems  No fever chills night sweats    Objective:   Pulse 68   Temp 97.5 °F (36.4 °C) (Temporal)   Ht 5' 11\" (1.803 m)   Wt 146 lb (66.2 kg)   SpO2 99%   BMI 20.36 kg/m²     ORTHOEXAM    Amputation stump looks good however he has weakness in both the middle finger but more so the fourth and fifth. He has no ulnar sensation loss and therefore I do not think it safe from a ulnar nerve perspective at this point time. Although that cannot be completely ruled out at this time. Unfortunately his fingers are extremely stiff so is very difficult to assess. Assessment:       Diagnosis Orders   1. Contracture of hand              Plan: To the amount of stiffness he is has had benefit from formal therapy has to have release passive motion before returning excessive clinically to make any determination therefore the thing that needs to be done he needs to go to therapy aggressively for 2 to 3 weeks and work on his motion once he can make a fist passively I will reassess him clinically. He is comfortable to plan. Prescription for therapy is been written. No orders of the defined types were placed in this encounter. No orders of the defined types were placed in this encounter. No follow-ups on file.       Mable Palomares MD

## 2023-03-06 ENCOUNTER — TELEPHONE (OUTPATIENT)
Dept: PRIMARY CARE | Facility: CLINIC | Age: 73
End: 2023-03-06
Payer: MEDICARE

## 2023-03-06 DIAGNOSIS — I10 PRIMARY HYPERTENSION: Primary | ICD-10-CM

## 2023-03-06 RX ORDER — LISINOPRIL 40 MG/1
40 TABLET ORAL DAILY
Qty: 90 TABLET | Refills: 3 | Status: SHIPPED | OUTPATIENT
Start: 2023-03-06 | End: 2024-02-28

## 2023-04-18 ENCOUNTER — OFFICE VISIT (OUTPATIENT)
Dept: PRIMARY CARE | Facility: CLINIC | Age: 73
End: 2023-04-18
Payer: MEDICARE

## 2023-04-18 VITALS
BODY MASS INDEX: 21.05 KG/M2 | RESPIRATION RATE: 14 BRPM | HEIGHT: 70 IN | DIASTOLIC BLOOD PRESSURE: 60 MMHG | HEART RATE: 66 BPM | OXYGEN SATURATION: 98 % | WEIGHT: 147 LBS | SYSTOLIC BLOOD PRESSURE: 100 MMHG | TEMPERATURE: 97.3 F

## 2023-04-18 DIAGNOSIS — Z00.00 PERIODIC HEALTH ASSESSMENT, GENERAL SCREENING, ADULT: Primary | ICD-10-CM

## 2023-04-18 DIAGNOSIS — R73.9 ELEVATED BLOOD SUGAR: ICD-10-CM

## 2023-04-18 DIAGNOSIS — Z12.5 SCREENING FOR PROSTATE CANCER: ICD-10-CM

## 2023-04-18 DIAGNOSIS — I10 PRIMARY HYPERTENSION: ICD-10-CM

## 2023-04-18 DIAGNOSIS — Z00.00 ENCOUNTER FOR ANNUAL WELLNESS EXAM IN MEDICARE PATIENT: ICD-10-CM

## 2023-04-18 DIAGNOSIS — E46 PROTEIN-CALORIE MALNUTRITION, UNSPECIFIED SEVERITY (MULTI): ICD-10-CM

## 2023-04-18 DIAGNOSIS — D12.3 ADENOMATOUS POLYP OF TRANSVERSE COLON: ICD-10-CM

## 2023-04-18 DIAGNOSIS — Z12.11 ENCOUNTER FOR SCREENING COLONOSCOPY: ICD-10-CM

## 2023-04-18 PROCEDURE — 3078F DIAST BP <80 MM HG: CPT | Performed by: INTERNAL MEDICINE

## 2023-04-18 PROCEDURE — 3074F SYST BP LT 130 MM HG: CPT | Performed by: INTERNAL MEDICINE

## 2023-04-18 PROCEDURE — 1160F RVW MEDS BY RX/DR IN RCRD: CPT | Performed by: INTERNAL MEDICINE

## 2023-04-18 PROCEDURE — G0439 PPPS, SUBSEQ VISIT: HCPCS | Performed by: INTERNAL MEDICINE

## 2023-04-18 PROCEDURE — 1170F FXNL STATUS ASSESSED: CPT | Performed by: INTERNAL MEDICINE

## 2023-04-18 PROCEDURE — 99213 OFFICE O/P EST LOW 20 MIN: CPT | Performed by: INTERNAL MEDICINE

## 2023-04-18 PROCEDURE — 1159F MED LIST DOCD IN RCRD: CPT | Performed by: INTERNAL MEDICINE

## 2023-04-18 PROCEDURE — 1036F TOBACCO NON-USER: CPT | Performed by: INTERNAL MEDICINE

## 2023-04-18 RX ORDER — SILDENAFIL 50 MG/1
TABLET, FILM COATED ORAL
COMMUNITY
Start: 2020-01-20

## 2023-04-18 RX ORDER — AMLODIPINE BESYLATE 5 MG/1
5 TABLET ORAL DAILY
COMMUNITY
End: 2023-11-07

## 2023-04-18 RX ORDER — CARVEDILOL 25 MG/1
12.5 TABLET ORAL 2 TIMES DAILY
COMMUNITY
End: 2023-04-18 | Stop reason: ALTCHOICE

## 2023-04-18 RX ORDER — CARVEDILOL 6.25 MG/1
12.5 TABLET ORAL
Qty: 360 TABLET | Refills: 3 | Status: SHIPPED | OUTPATIENT
Start: 2023-04-18 | End: 2023-10-31 | Stop reason: SDUPTHER

## 2023-04-18 RX ORDER — LISINOPRIL 40 MG/1
1 TABLET ORAL DAILY
COMMUNITY
Start: 2020-01-17 | End: 2023-04-18

## 2023-04-18 RX ORDER — ATORVASTATIN CALCIUM 40 MG/1
40 TABLET, FILM COATED ORAL NIGHTLY
COMMUNITY
End: 2023-10-31 | Stop reason: SDUPTHER

## 2023-04-18 ASSESSMENT — ACTIVITIES OF DAILY LIVING (ADL)
MANAGING_FINANCES: INDEPENDENT
GROCERY_SHOPPING: INDEPENDENT
DRESSING: INDEPENDENT
TAKING_MEDICATION: INDEPENDENT
BATHING: INDEPENDENT
DOING_HOUSEWORK: INDEPENDENT

## 2023-04-18 ASSESSMENT — PATIENT HEALTH QUESTIONNAIRE - PHQ9
1. LITTLE INTEREST OR PLEASURE IN DOING THINGS: NOT AT ALL
SUM OF ALL RESPONSES TO PHQ9 QUESTIONS 1 AND 2: 0
2. FEELING DOWN, DEPRESSED OR HOPELESS: NOT AT ALL

## 2023-04-18 ASSESSMENT — ENCOUNTER SYMPTOMS
DEPRESSION: 0
WHEEZING: 0
HYPERTENSION: 1
COUGH: 0
PALPITATIONS: 0
LOSS OF SENSATION IN FEET: 0
SHORTNESS OF BREATH: 0
OCCASIONAL FEELINGS OF UNSTEADINESS: 0
ABDOMINAL PAIN: 0

## 2023-04-18 NOTE — PROGRESS NOTES
"Subjective   Patient ID: Adonay Betancourt is a 72 y.o. male who presents for AWV and  Hypertension ( 6 month follow up hypertension.) and Medicare Annual Wellness Visit Subsequent.    Hypertension  Pertinent negatives include no chest pain, palpitations or shortness of breath.    He also denies any issues with dizzy spells.  Some fatigue.  Getting around ok with much issue.  Working on taking care of Brisa.      Review of Systems   Respiratory:  Negative for cough, shortness of breath and wheezing.    Cardiovascular:  Positive for leg swelling. Negative for chest pain and palpitations.   Gastrointestinal:  Negative for abdominal pain.       Objective   /60 (BP Location: Left arm, Patient Position: Sitting, BP Cuff Size: Adult)   Pulse 66   Temp 36.3 °C (97.3 °F) (Tympanic)   Resp 14   Ht 1.778 m (5' 10\")   Wt 66.7 kg (147 lb)   SpO2 98%   BMI 21.09 kg/m²     Physical Exam  Vitals reviewed.   Constitutional:       Appearance: Normal appearance.   HENT:      Head: Normocephalic.   Cardiovascular:      Rate and Rhythm: Normal rate and regular rhythm.      Pulses: Normal pulses.      Heart sounds: Normal heart sounds.   Pulmonary:      Effort: Pulmonary effort is normal.      Breath sounds: Normal breath sounds.   Musculoskeletal:         General: Normal range of motion.   Neurological:      General: No focal deficit present.      Mental Status: He is alert.   Psychiatric:         Mood and Affect: Mood normal.         Assessment/Plan   Problem List Items Addressed This Visit          Endocrine/Metabolic    Protein-calorie malnutrition, unspecified severity (CMS/[facility])     Other Visit Diagnoses       Periodic health assessment, general screening, adult    -  Primary    Relevant Orders    CBC    Comprehensive Metabolic Panel    Lipid Panel    Primary hypertension        Relevant Medications    carvedilol (Coreg) 6.25 mg tablet    Other Relevant Orders    Lipid Panel    Elevated blood sugar        Relevant " Orders    Comprehensive Metabolic Panel    Lipid Panel    Screening for prostate cancer        Relevant Orders    Prostate Specific Antigen    Adenomatous polyp of transverse colon        Relevant Orders    CBC    Encounter for screening colonoscopy        Relevant Orders    Colonoscopy    Encounter for annual wellness exam in Medicare patient            Reviewed and discussed all of the above.    His BP is low here as is his HR.  This is fairly asymptomatic.  However due this and his fatigue we will decrease his carvedilol by half tablet twice daily.    We reviewed his previous lab results as well as his previous colonoscopy.  He would like to have this rechecked as recommended by GI.    Annual Wellness Visit questions and answers were reviewed and discussed including the importance of discussing end of life wishes as well as having a living will and health care power of .     Follow up in 6 months - sooner if any issues.

## 2023-07-26 ENCOUNTER — HOSPITAL ENCOUNTER (OUTPATIENT)
Dept: DATA CONVERSION | Facility: HOSPITAL | Age: 73
End: 2023-07-26
Attending: SURGERY | Admitting: SURGERY
Payer: MEDICARE

## 2023-07-26 DIAGNOSIS — F17.290 NICOTINE DEPENDENCE, OTHER TOBACCO PRODUCT, UNCOMPLICATED: ICD-10-CM

## 2023-07-26 DIAGNOSIS — Z86.010 PERSONAL HISTORY OF COLONIC POLYPS: ICD-10-CM

## 2023-07-26 DIAGNOSIS — Z12.11 ENCOUNTER FOR SCREENING FOR MALIGNANT NEOPLASM OF COLON: ICD-10-CM

## 2023-07-26 DIAGNOSIS — K63.5 POLYP OF COLON: ICD-10-CM

## 2023-07-26 DIAGNOSIS — K57.30 DIVERTICULOSIS OF LARGE INTESTINE WITHOUT PERFORATION OR ABSCESS WITHOUT BLEEDING: ICD-10-CM

## 2023-07-26 DIAGNOSIS — D12.3 BENIGN NEOPLASM OF TRANSVERSE COLON: ICD-10-CM

## 2023-07-26 DIAGNOSIS — D12.6 BENIGN NEOPLASM OF COLON, UNSPECIFIED: ICD-10-CM

## 2023-07-26 DIAGNOSIS — K64.2 THIRD DEGREE HEMORRHOIDS: ICD-10-CM

## 2023-07-26 LAB
ATRIAL RATE: 66 BPM
P AXIS: 54 DEGREES
P OFFSET: 181 MS
P ONSET: 119 MS
PR INTERVAL: 178 MS
Q ONSET: 208 MS
QRS COUNT: 11 BEATS
QRS DURATION: 98 MS
QT INTERVAL: 434 MS
QTC CALCULATION(BAZETT): 454 MS
QTC FREDERICIA: 448 MS
R AXIS: -8 DEGREES
T AXIS: 23 DEGREES
T OFFSET: 425 MS
VENTRICULAR RATE: 66 BPM

## 2023-08-03 LAB
COMPLETE PATHOLOGY REPORT: NORMAL
CONVERTED CLINICAL DIAGNOSIS-HISTORY: NORMAL
CONVERTED FINAL DIAGNOSIS: NORMAL
CONVERTED FINAL REPORT PDF LINK TO COPY AND PASTE: NORMAL
CONVERTED GROSS DESCRIPTION: NORMAL

## 2023-08-16 ENCOUNTER — APPOINTMENT (OUTPATIENT)
Dept: GENERAL RADIOLOGY | Age: 73
End: 2023-08-16
Payer: MEDICARE

## 2023-08-16 ENCOUNTER — HOSPITAL ENCOUNTER (EMERGENCY)
Age: 73
Discharge: HOME | End: 2023-08-16
Payer: MEDICARE

## 2023-08-16 VITALS
WEIGHT: 155 LBS | OXYGEN SATURATION: 100 % | HEIGHT: 70 IN | SYSTOLIC BLOOD PRESSURE: 135 MMHG | BODY MASS INDEX: 22.19 KG/M2 | HEART RATE: 80 BPM | DIASTOLIC BLOOD PRESSURE: 80 MMHG | RESPIRATION RATE: 18 BRPM | TEMPERATURE: 97.7 F
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
## 2023-08-16 DIAGNOSIS — S20.212A CONTUSION OF RIB ON LEFT SIDE, INITIAL ENCOUNTER: ICD-10-CM

## 2023-08-16 DIAGNOSIS — S62.609A CLOSED FRACTURE OF PHALANX OF DIGIT OF HAND, INITIAL ENCOUNTER: Primary | ICD-10-CM

## 2023-08-16 PROCEDURE — 99283 EMERGENCY DEPT VISIT LOW MDM: CPT

## 2023-08-16 PROCEDURE — 73130 X-RAY EXAM OF HAND: CPT

## 2023-08-16 PROCEDURE — 71101 X-RAY EXAM UNILAT RIBS/CHEST: CPT

## 2023-08-16 PROCEDURE — 6370000000 HC RX 637 (ALT 250 FOR IP)

## 2023-08-16 PROCEDURE — 29125 APPL SHORT ARM SPLINT STATIC: CPT

## 2023-08-16 RX ORDER — ACETAMINOPHEN 325 MG/1
650 TABLET ORAL ONCE
Status: COMPLETED | OUTPATIENT
Start: 2023-08-16 | End: 2023-08-16

## 2023-08-16 RX ADMIN — ACETAMINOPHEN 650 MG: 325 TABLET ORAL at 12:40

## 2023-08-16 ASSESSMENT — LIFESTYLE VARIABLES
HOW MANY STANDARD DRINKS CONTAINING ALCOHOL DO YOU HAVE ON A TYPICAL DAY: 1 OR 2
HOW OFTEN DO YOU HAVE A DRINK CONTAINING ALCOHOL: 2-4 TIMES A MONTH

## 2023-08-16 ASSESSMENT — PAIN SCALES - GENERAL: PAINLEVEL_OUTOF10: 0

## 2023-08-16 ASSESSMENT — ENCOUNTER SYMPTOMS
SORE THROAT: 0
ABDOMINAL PAIN: 0
BACK PAIN: 0
COUGH: 0
SHORTNESS OF BREATH: 0
DIARRHEA: 0
NAUSEA: 0
VOMITING: 0

## 2023-08-16 NOTE — DISCHARGE INSTRUCTIONS
Please follow up with orthopedics as discussed. Return to ED for any new or worsening symptoms. Please take Tylenol po as needed for pain. Rest, ice, and elevate left hand. Keep splint intact until orthopedic follow up.

## 2023-08-16 NOTE — ED PROVIDER NOTES
[address] [facility] ED  eMERGENCYdEPARTMENT eNCOUnter      Pt Name: Gibran Mohr  MRN: [id number]  [address] [facility] [birth year][facility] evaluation: 8/16/2023  Provider:FRANCISCO Hinton CNP    CHIEF COMPLAINT       Chief Complaint   Patient presents with    Hand Pain     Left wrist and 4th finger pain. Tripped over dog and fell on Monday. Chest Injury     Left sided chest pain caused during fall. HISTORY OF PRESENT ILLNESS  (Location/Symptom, Timing/Onset, Context/Setting, Quality, Duration, Modifying Factors, Severity.)   Gibran Mohr is a 74 y.o. male hx of HTN and chronic back pain who presents to the emergency department with hand injury and rib pain. Presents to emergency department with complaints of left hand injury and left-sided rib injury. Patient states on Monday he had his dog at the [employer], his dog is a large [place] husky. He states he was talking to a lady and his dog went to lunch while he was attached to the leash held by his left hand causing the patient to fall. When the patient fell his left hand broke his fall he landed in his left hand and left side of rib cage. He he complains of left hand pain and swelling with difficulty bending fourth and fifth digit. He has not taken anything for his hand pain he rates a 4 out of 10 dull ache worse with attempt to move. He also complains of some tenderness to left side of rib cage is reproducible on palpation. He denies any head injury or LOC. He denies any chest pain, shortness of breath, abdominal pain, nausea, vomiting, diarrhea, fever, chills, headache, or recent illness. HPI    Nursing Notes were reviewed and I agree. REVIEW OF SYSTEMS    (2-9 systems for level 4, 10 or more for level 5)     Review of Systems   Constitutional:  Negative for activity change, chills and fever. HENT:  Negative for ear pain and sore throat. Eyes:  Negative for visual disturbance. Respiratory:  Negative for cough and shortness of breath.

## 2023-08-17 ENCOUNTER — OFFICE VISIT (OUTPATIENT)
Dept: ORTHOPEDIC SURGERY | Age: 73
End: 2023-08-17

## 2023-08-17 DIAGNOSIS — S62.615A CLOSED DISPLACED FRACTURE OF PROXIMAL PHALANX OF LEFT RING FINGER, INITIAL ENCOUNTER: Primary | ICD-10-CM

## 2023-08-17 RX ORDER — AMLODIPINE BESYLATE 5 MG/1
TABLET ORAL
COMMUNITY
Start: 2023-08-04

## 2023-08-17 ASSESSMENT — ENCOUNTER SYMPTOMS: RESPIRATORY NEGATIVE: 1

## 2023-08-17 NOTE — PROGRESS NOTES
Pito Garg (:  1950) is a 74 y.o. male,New patient, here for evaluation of the following chief complaint(s):  New Patient (ED follow up for LT 4th digit metacarpal fracture, states he fell walking his dog, injuring his LT ring finger, occurred on 23)         ASSESSMENT/PLAN:  1. Closed displaced fracture of proximal phalanx of left ring finger, initial encounter      No follow-ups on file. Subjective   SUBJECTIVE/OBJECTIVE:  This is a 75-year-old right-hand-dominant male complaining of left hand pain. States last night he was walking his dog when his dog pulled away to see another dog injuring his left ring finger. Patient was seen in emergency department where x-rays show a fracture of the proximal phalanx of the ring finger. Review of Systems   Constitutional: Negative. HENT: Negative. Respiratory: Negative. Skin: Negative. Neurological: Negative. Objective   Zaida Allred MD  [phone] 2023      Narrative & Impression  EXAMINATION:  THREE XRAY VIEWS OF THE LEFT HAND     2023 12:43 pm     COMPARISON:  None. HISTORY:  ORDERING SYSTEM PROVIDED HISTORY: fall, pain at base of 4th digit  TECHNOLOGIST PROVIDED HISTORY:  Reason for exam:->fall, pain at base of 4th digit  What reading provider will be dictating this exam?->CRC     FINDINGS:  Acute, mildly comminuted and mildly displaced fracture of the proximal  aspect, proximal phalanx, left 4th finger. The fracture appears  extra-articular and without significant angular deformity. Remote subtotal amputation of the distal phalanx, left 3rd finger. At the wrist, findings of advanced scapholunate advanced collapse (SLAC  wrist) with widening of the scapholunate interval, proximal migration of the  capitate, and pressure erosion of the distal radius bordering the navicular. There is secondary shortening of the proximal carpal row.   Extensive  dystrophic calcifications are present at the

## 2023-09-13 ENCOUNTER — HOSPITAL ENCOUNTER (OUTPATIENT)
Dept: GENERAL RADIOLOGY | Age: 73
Discharge: HOME | End: 2023-09-15
Payer: MEDICARE

## 2023-09-13 ENCOUNTER — OFFICE VISIT (OUTPATIENT)
Dept: ORTHOPEDIC SURGERY | Age: 73
End: 2023-09-13

## 2023-09-13 DIAGNOSIS — S62.615D CLOSED DISPLACED FRACTURE OF PROXIMAL PHALANX OF LEFT RING FINGER WITH ROUTINE HEALING, SUBSEQUENT ENCOUNTER: Primary | ICD-10-CM

## 2023-09-13 DIAGNOSIS — S62.615D CLOSED DISPLACED FRACTURE OF PROXIMAL PHALANX OF LEFT RING FINGER WITH ROUTINE HEALING, SUBSEQUENT ENCOUNTER: ICD-10-CM

## 2023-09-13 PROCEDURE — 99024 POSTOP FOLLOW-UP VISIT: CPT | Performed by: PHYSICIAN ASSISTANT

## 2023-09-13 PROCEDURE — 73130 X-RAY EXAM OF HAND: CPT

## 2023-09-13 ASSESSMENT — ENCOUNTER SYMPTOMS: RESPIRATORY NEGATIVE: 1

## 2023-09-13 NOTE — PROGRESS NOTES
Rivka Whipple (:  1950) is a 74 y.o. male,Established patient, here for evaluation of the following chief complaint(s):  Follow-up (Follow up visit for Closed displaced fracture of proximal phalanx of left ring finger, says finger is still a little sore, repeat x-rays in Epic)         ASSESSMENT/PLAN:  1. Closed displaced fracture of proximal phalanx of left ring finger with routine healing, subsequent encounter  -     XR HAND LEFT (MIN 3 VIEWS); Future  -     Ambulatory referral to Occupational Therapy      No follow-ups on file. Subjective   SUBJECTIVE/OBJECTIVE:  Is a 75-year-old right-hand-dominant male following up for complaint of fracture of the left ring finger proximal phalanx sustained 2023. Patient has been in an [employer] He states that to help support his wrist as well. He denies change in sensation of his fingers. He states the finger has minimal to no pain. Review of Systems   Constitutional: Negative. HENT: Negative. Respiratory: Negative. Skin: Negative. Neurological: Negative. Objective     X-rays left hand, 3 views, healing midshaft fracture of the ring finger. No articular involvement  Physical Exam  Musculoskeletal:      Comments: Left hand-no swelling over the ring finger. No tenderness with palpation midshaft proximal phalanx ring finger. Distal amputation of the middle finger full extension, flexion is near symmetrical with the middle finger. Sensation is intact distally to light touch. Capillary refill is brisk. Explained the x-ray to the patient. He will come out of the ulnar gutter brace and transition to gabe taping. I like to see him back in about a month.   He will begin occupational therapy for range of motion exercises       On this date 2023 I have spent 35 minutes reviewing previous notes, test results and face to face with the patient discussing the diagnosis and importance of compliance with the

## 2023-09-30 NOTE — H&P
History & Physical Reviewed:   I have reviewed the History and Physical dated:  26-Jul-2023   History and Physical reviewed and relevant findings noted. Patient examined to review pertinent physical  findings.: No significant changes   Home Medications Reviewed: no changes noted   Allergies Reviewed: no changes noted       ERAS (Enhanced Recovery After Surgery):  ·  ERAS Patient: no     Consent:   COVID-19 Consent:  ·  COVID-19 Risk Consent Surgeon has reviewed key risks related to the risk of prem COVID-19 and if they contract COVID-19 what the risks are.       Electronic Signatures:  Mitesh Booth)  (Signed 26-Jul-2023 07:11)   Authored: History & Physical Reviewed, ERAS, Consent,  Note Completion      Last Updated: 26-Jul-2023 07:11 by Mitesh Booth)

## 2023-10-11 ENCOUNTER — HOSPITAL ENCOUNTER (OUTPATIENT)
Dept: GENERAL RADIOLOGY | Age: 73
Discharge: HOME | End: 2023-10-13
Payer: MEDICARE

## 2023-10-11 ENCOUNTER — OFFICE VISIT (OUTPATIENT)
Dept: ORTHOPEDIC SURGERY | Age: 73
End: 2023-10-11

## 2023-10-11 DIAGNOSIS — S62.615D CLOSED DISPLACED FRACTURE OF PROXIMAL PHALANX OF LEFT RING FINGER WITH ROUTINE HEALING, SUBSEQUENT ENCOUNTER: Primary | ICD-10-CM

## 2023-10-11 DIAGNOSIS — S62.615D CLOSED DISPLACED FRACTURE OF PROXIMAL PHALANX OF LEFT RING FINGER WITH ROUTINE HEALING, SUBSEQUENT ENCOUNTER: ICD-10-CM

## 2023-10-11 PROCEDURE — 73130 X-RAY EXAM OF HAND: CPT

## 2023-10-11 PROCEDURE — 99024 POSTOP FOLLOW-UP VISIT: CPT | Performed by: PHYSICIAN ASSISTANT

## 2023-10-11 ASSESSMENT — ENCOUNTER SYMPTOMS: RESPIRATORY NEGATIVE: 1

## 2023-10-11 NOTE — PROGRESS NOTES
Rona Harris (:  1950) is a 74 y.o. male,Established patient, here for evaluation of the following chief complaint(s):  No chief complaint on file. ASSESSMENT/PLAN:  1. Closed displaced fracture of proximal phalanx of left ring finger with routine healing, subsequent encounter  -     XR HAND LEFT (MIN 3 VIEWS); Future      No follow-ups on file. Subjective   SUBJECTIVE/OBJECTIVE:  9year-old right-hand-dominant male following up for complaint of fracture of the proximal phalanx of the left ring finger sustained 2023 when his dog leash pulled on his hand. Review of Systems   Constitutional: Negative. HENT: Negative. Respiratory: Negative. Skin: Negative. Neurological: Negative. Objective   Physical Exam  Musculoskeletal:      Comments: Left hand-no swelling or ecchymosis. Full extension of all fingers. Decreased flexion range of motion of the ring finger. Nontender with palpation. Capillary refill is brisk     I explained the x-rays to the patient that show a healing fracture. I like him to begin occupational therapy to work on his range of motion. I suggested he get a tennis ball or racquetball or stress ball to begin working with his range of motion. I will see him back in about 6 weeks       On this date 10/11/2023 I have spent 35 minutes reviewing previous notes, test results and face to face with the patient discussing the diagnosis and importance of compliance with the treatment plan as well as documenting on the day of the visit. An electronic signature was used to authenticate this note.     --KATELYN Melgar

## 2023-10-23 ENCOUNTER — HOSPITAL ENCOUNTER (OUTPATIENT)
Dept: OCCUPATIONAL THERAPY | Age: 73
Setting detail: THERAPIES SERIES
Discharge: HOME | End: 2023-10-23

## 2023-10-23 NOTE — PROGRESS NOTES
Occupational Therapy: Initial Evaluation   Patient: Tim Armstrong (43 y.o. male)   Examination Date:   Plan of Care Certification Period: 10/23/2023 to        :  1950  MRN: [id number]  CSN: [id number]   Insurance: Payor: Lucila Speed / Plan: Reynaldo Ast ESSENTIAL/PLUS / Product Type: *No Product type* /   Insurance ID: [id number] - (Medicare Managed) Secondary Insurance (if applicable): Insurance Information:     Referring Physician: KATELYN Jj     PCP: Ewa rUias DO Visits to Date/Visits Approved:     /      No Show/Cancelled Appts:   /       Medical Diagnosis: Displaced fracture of proximal phalanx of left ring finger, subsequent encounter for fracture with routine healing [S62.615D]    No data recorded       Pt called at time of appointment and cx evaluation, states his finger is better and he wants to wait x2 weeks until he sees his doctor again. Therapy Time  Individual Time In:  1:00pm  Individual Time Out:  1:05pm  Minutes:  5        Therapist Signature: Flaca Cedeño, [id number]    Date:      I certify that the above Occupational Therapy Services are being furnished while the patient is under my care. I agree with the treatment plan and certify that this therapy is necessary. Physician's Signature:  ___________________________   Date:_______                                                                   KATELYN Jj        Physician Comments: _______________________________________________    Please sign and return to [address] [place] [address]. Please fax to the location listed below.  [facility] for this referral!    [address] [facility]  [address] [address]  Dept: [address]: [phone]       POC NOTE

## 2023-10-31 ENCOUNTER — TELEPHONE (OUTPATIENT)
Dept: PRIMARY CARE | Facility: CLINIC | Age: 73
End: 2023-10-31
Payer: MEDICARE

## 2023-10-31 DIAGNOSIS — I10 PRIMARY HYPERTENSION: Primary | ICD-10-CM

## 2023-10-31 RX ORDER — CARVEDILOL 6.25 MG/1
12.5 TABLET ORAL
Qty: 360 TABLET | Refills: 3 | Status: SHIPPED | OUTPATIENT
Start: 2023-10-31 | End: 2024-05-07 | Stop reason: SDUPTHER

## 2023-10-31 RX ORDER — ATORVASTATIN CALCIUM 40 MG/1
40 TABLET, FILM COATED ORAL NIGHTLY
Qty: 90 TABLET | Refills: 3 | Status: SHIPPED | OUTPATIENT
Start: 2023-10-31 | End: 2024-05-07 | Stop reason: SDUPTHER

## 2023-11-02 ENCOUNTER — OFFICE VISIT (OUTPATIENT)
Dept: PRIMARY CARE | Facility: CLINIC | Age: 73
End: 2023-11-02
Payer: MEDICARE

## 2023-11-02 VITALS
RESPIRATION RATE: 14 BRPM | HEART RATE: 68 BPM | BODY MASS INDEX: 22.88 KG/M2 | DIASTOLIC BLOOD PRESSURE: 60 MMHG | TEMPERATURE: 97 F | HEIGHT: 68 IN | WEIGHT: 151 LBS | SYSTOLIC BLOOD PRESSURE: 124 MMHG | OXYGEN SATURATION: 97 %

## 2023-11-02 DIAGNOSIS — Z00.00 PERIODIC HEALTH ASSESSMENT, GENERAL SCREENING, ADULT: ICD-10-CM

## 2023-11-02 DIAGNOSIS — Z12.5 SCREENING FOR PROSTATE CANCER: ICD-10-CM

## 2023-11-02 DIAGNOSIS — I10 PRIMARY HYPERTENSION: Primary | ICD-10-CM

## 2023-11-02 DIAGNOSIS — R73.9 ELEVATED BLOOD SUGAR: ICD-10-CM

## 2023-11-02 DIAGNOSIS — E78.00 PURE HYPERCHOLESTEROLEMIA: ICD-10-CM

## 2023-11-02 PROBLEM — G62.9 NEUROPATHY: Status: ACTIVE | Noted: 2023-11-02

## 2023-11-02 PROBLEM — N52.9 ERECTILE DYSFUNCTION: Status: ACTIVE | Noted: 2023-11-02

## 2023-11-02 PROBLEM — D12.6 TUBULAR ADENOMA OF COLON: Status: ACTIVE | Noted: 2023-11-02

## 2023-11-02 PROBLEM — E87.1 HYPONATREMIA: Status: ACTIVE | Noted: 2023-11-02

## 2023-11-02 PROBLEM — K57.30 DIVERTICULOSIS OF LARGE INTESTINE WITHOUT HEMORRHAGE: Status: ACTIVE | Noted: 2023-11-02

## 2023-11-02 PROBLEM — E78.5 HLD (HYPERLIPIDEMIA): Status: ACTIVE | Noted: 2023-11-02

## 2023-11-02 PROCEDURE — 1159F MED LIST DOCD IN RCRD: CPT | Performed by: INTERNAL MEDICINE

## 2023-11-02 PROCEDURE — 1160F RVW MEDS BY RX/DR IN RCRD: CPT | Performed by: INTERNAL MEDICINE

## 2023-11-02 PROCEDURE — 3074F SYST BP LT 130 MM HG: CPT | Performed by: INTERNAL MEDICINE

## 2023-11-02 PROCEDURE — 3078F DIAST BP <80 MM HG: CPT | Performed by: INTERNAL MEDICINE

## 2023-11-02 PROCEDURE — 1036F TOBACCO NON-USER: CPT | Performed by: INTERNAL MEDICINE

## 2023-11-02 PROCEDURE — 99213 OFFICE O/P EST LOW 20 MIN: CPT | Performed by: INTERNAL MEDICINE

## 2023-11-02 ASSESSMENT — ENCOUNTER SYMPTOMS
PALPITATIONS: 0
WHEEZING: 0
COUGH: 0
SHORTNESS OF BREATH: 0
DIARRHEA: 0
CONSTIPATION: 0
RHINORRHEA: 1
NAUSEA: 0
ABDOMINAL PAIN: 0
HYPERTENSION: 1

## 2023-11-02 NOTE — PROGRESS NOTES
"Subjective   Patient ID: Adonay Betancourt is a 73 y.o. male who presents for Hypertension.    Hypertension  Pertinent negatives include no chest pain, palpitations or shortness of breath.        Review of Systems   HENT:  Positive for postnasal drip and rhinorrhea.    Respiratory:  Negative for cough, shortness of breath and wheezing.    Cardiovascular:  Negative for chest pain and palpitations.   Gastrointestinal:  Negative for abdominal pain, constipation, diarrhea and nausea.     Objective   /60 (BP Location: Left arm, Patient Position: Sitting, BP Cuff Size: Adult)   Pulse 68   Temp 36.1 °C (97 °F) (Tympanic)   Resp 14   Ht 1.727 m (5' 8\")   Wt 68.5 kg (151 lb)   SpO2 97%   BMI 22.96 kg/m²     Physical Exam  Vitals reviewed.   Constitutional:       Appearance: Normal appearance.   HENT:      Head: Normocephalic.   Cardiovascular:      Rate and Rhythm: Normal rate and regular rhythm.   Pulmonary:      Effort: Pulmonary effort is normal.      Breath sounds: Normal breath sounds.   Musculoskeletal:         General: Normal range of motion.   Neurological:      General: No focal deficit present.      Mental Status: He is alert.   Psychiatric:         Mood and Affect: Mood normal.         Assessment/Plan   Problem List Items Addressed This Visit             ICD-10-CM    Elevated blood sugar R73.9    Relevant Orders    Thyroid Stimulating Hormone    Primary hypertension - Primary I10    Relevant Orders    CBC    Comprehensive Metabolic Panel    Pure hypercholesterolemia E78.00    Relevant Orders    Lipid Panel    Thyroid Stimulating Hormone     Other Visit Diagnoses         Codes    Screening for prostate cancer     Z12.5    Relevant Orders    Prostate Specific Antigen    Periodic health assessment, general screening, adult     Z00.00    Relevant Orders    CBC    Comprehensive Metabolic Panel    Lipid Panel    Thyroid Stimulating Hormone        HTN controlled.  NO changes needed..  Tolerating current " mediations.     We will continue to monitor and treat ongoing risk factors.    Follow up in 6 months - sooner if any issues.

## 2023-11-08 ENCOUNTER — HOSPITAL ENCOUNTER (OUTPATIENT)
Dept: GENERAL RADIOLOGY | Age: 73
Discharge: HOME | End: 2023-11-10
Payer: MEDICARE

## 2023-11-08 ENCOUNTER — OFFICE VISIT (OUTPATIENT)
Dept: ORTHOPEDIC SURGERY | Age: 73
End: 2023-11-08

## 2023-11-08 VITALS
BODY MASS INDEX: 22.19 KG/M2 | TEMPERATURE: 97.9 F | WEIGHT: 155 LBS | HEIGHT: 70 IN | HEART RATE: 78 BPM | OXYGEN SATURATION: 99 %

## 2023-11-08 DIAGNOSIS — S62.615D CLOSED DISPLACED FRACTURE OF PROXIMAL PHALANX OF LEFT RING FINGER WITH ROUTINE HEALING, SUBSEQUENT ENCOUNTER: Primary | ICD-10-CM

## 2023-11-08 DIAGNOSIS — S62.615D CLOSED DISPLACED FRACTURE OF PROXIMAL PHALANX OF LEFT RING FINGER WITH ROUTINE HEALING, SUBSEQUENT ENCOUNTER: ICD-10-CM

## 2023-11-08 DIAGNOSIS — R20.0 BILATERAL HAND NUMBNESS: ICD-10-CM

## 2023-11-08 PROCEDURE — 73130 X-RAY EXAM OF HAND: CPT

## 2023-11-08 PROCEDURE — 99024 POSTOP FOLLOW-UP VISIT: CPT | Performed by: PHYSICIAN ASSISTANT

## 2023-11-08 ASSESSMENT — ENCOUNTER SYMPTOMS: RESPIRATORY NEGATIVE: 1

## 2023-11-08 NOTE — PROGRESS NOTES
Kim Boyle (:  1950) is a 74 y.o. male,Established patient, here for evaluation of the following chief complaint(s):  Follow-up (Pt here for f/u for left boxer's fx)         ASSESSMENT/PLAN:  1. Closed displaced fracture of proximal phalanx of left ring finger with routine healing, subsequent encounter  -     XR HAND LEFT (MIN 3 VIEWS); Future  2. Bilateral hand numbness      No follow-ups on file. Subjective   SUBJECTIVE/OBJECTIVE:  Is a 66-year-old right-hand-dominant retiree following up for complaint of left ring finger fracture. He states the range of motion of the finger is improving. He states his pain is minimal.  He does complain of bilateral hand numbness. 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 He states he is beginning to drop items. Review of Systems   Constitutional: Negative. HENT: Negative. Respiratory: Negative. Skin: Negative. Neurological: Negative. Objective     Radiographs left hand-healing fracture proximal phalanx of the ring finger with improved alignment since previous x-rays. 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 Physical Exam  Musculoskeletal:      Comments: Left hand-full extension of all fingers. There is swelling at the proximal phalanx of the ring finger. Improved flexion range of motion. Thenar wasting is present. Phalen's is present. Tinel's does not elicit neurologic symptoms. Sensation is decreased to light touch of the index and middle fingers. Capillary refill is brisk. Right hand-full extension of all fingers. Hand grasp is symmetrical.  Thenar wasting is present. Phalen's is positive. Tinel's does not elicit neurologic symptoms. Decree sensation to light touch at the index and middle fingers. Capillary refill is brisk     Explained to the patient his fracture is improving. He may resume regular activities.   I have ordered an EMG study to evaluate the patient's hand numbness which is consistent

## 2024-02-28 DIAGNOSIS — I10 PRIMARY HYPERTENSION: ICD-10-CM

## 2024-02-28 RX ORDER — LISINOPRIL 40 MG/1
40 TABLET ORAL DAILY
Qty: 90 TABLET | Refills: 3 | Status: SHIPPED | OUTPATIENT
Start: 2024-02-28 | End: 2024-05-07 | Stop reason: SDUPTHER

## 2024-05-07 ENCOUNTER — LAB (OUTPATIENT)
Dept: LAB | Facility: LAB | Age: 74
End: 2024-05-07
Payer: MEDICARE

## 2024-05-07 ENCOUNTER — OFFICE VISIT (OUTPATIENT)
Dept: PRIMARY CARE | Facility: CLINIC | Age: 74
End: 2024-05-07
Payer: MEDICARE

## 2024-05-07 VITALS
HEART RATE: 80 BPM | TEMPERATURE: 97.1 F | OXYGEN SATURATION: 98 % | RESPIRATION RATE: 14 BRPM | BODY MASS INDEX: 23.79 KG/M2 | SYSTOLIC BLOOD PRESSURE: 130 MMHG | HEIGHT: 68 IN | DIASTOLIC BLOOD PRESSURE: 60 MMHG | WEIGHT: 157 LBS

## 2024-05-07 DIAGNOSIS — I10 PRIMARY HYPERTENSION: ICD-10-CM

## 2024-05-07 DIAGNOSIS — E78.00 PURE HYPERCHOLESTEROLEMIA: ICD-10-CM

## 2024-05-07 DIAGNOSIS — Z00.00 PERIODIC HEALTH ASSESSMENT, GENERAL SCREENING, ADULT: ICD-10-CM

## 2024-05-07 DIAGNOSIS — R73.9 ELEVATED BLOOD SUGAR: ICD-10-CM

## 2024-05-07 DIAGNOSIS — Z12.5 SCREENING FOR PROSTATE CANCER: ICD-10-CM

## 2024-05-07 DIAGNOSIS — Z00.00 WELLNESS EXAMINATION: Primary | ICD-10-CM

## 2024-05-07 DIAGNOSIS — E46 PROTEIN-CALORIE MALNUTRITION, UNSPECIFIED SEVERITY (MULTI): ICD-10-CM

## 2024-05-07 DIAGNOSIS — I10 ESSENTIAL (PRIMARY) HYPERTENSION: ICD-10-CM

## 2024-05-07 LAB
ALBUMIN SERPL BCP-MCNC: 4 G/DL (ref 3.4–5)
ALP SERPL-CCNC: 94 U/L (ref 33–136)
ALT SERPL W P-5'-P-CCNC: 15 U/L (ref 10–52)
ANION GAP SERPL CALC-SCNC: 11 MMOL/L (ref 10–20)
AST SERPL W P-5'-P-CCNC: 19 U/L (ref 9–39)
BILIRUB SERPL-MCNC: 0.9 MG/DL (ref 0–1.2)
BUN SERPL-MCNC: 13 MG/DL (ref 6–23)
CALCIUM SERPL-MCNC: 8.8 MG/DL (ref 8.6–10.3)
CHLORIDE SERPL-SCNC: 104 MMOL/L (ref 98–107)
CHOLEST SERPL-MCNC: 117 MG/DL (ref 0–199)
CHOLESTEROL/HDL RATIO: 2.2
CO2 SERPL-SCNC: 26 MMOL/L (ref 21–32)
CREAT SERPL-MCNC: 1.15 MG/DL (ref 0.5–1.3)
EGFRCR SERPLBLD CKD-EPI 2021: 66 ML/MIN/1.73M*2
ERYTHROCYTE [DISTWIDTH] IN BLOOD BY AUTOMATED COUNT: 13.7 % (ref 11.5–14.5)
GLUCOSE SERPL-MCNC: 148 MG/DL (ref 74–99)
HCT VFR BLD AUTO: 40.2 % (ref 41–52)
HDLC SERPL-MCNC: 52.8 MG/DL
HGB BLD-MCNC: 13.7 G/DL (ref 13.5–17.5)
LDLC SERPL CALC-MCNC: 43 MG/DL
MCH RBC QN AUTO: 33.8 PG (ref 26–34)
MCHC RBC AUTO-ENTMCNC: 34.1 G/DL (ref 32–36)
MCV RBC AUTO: 99 FL (ref 80–100)
NON HDL CHOLESTEROL: 64 MG/DL (ref 0–149)
NRBC BLD-RTO: 0 /100 WBCS (ref 0–0)
PLATELET # BLD AUTO: 252 X10*3/UL (ref 150–450)
POTASSIUM SERPL-SCNC: 4 MMOL/L (ref 3.5–5.3)
PROT SERPL-MCNC: 7.5 G/DL (ref 6.4–8.2)
PSA SERPL-MCNC: 1.4 NG/ML
RBC # BLD AUTO: 4.05 X10*6/UL (ref 4.5–5.9)
SODIUM SERPL-SCNC: 137 MMOL/L (ref 136–145)
TRIGL SERPL-MCNC: 106 MG/DL (ref 0–149)
TSH SERPL-ACNC: 0.75 MIU/L (ref 0.44–3.98)
VLDL: 21 MG/DL (ref 0–40)
WBC # BLD AUTO: 12.2 X10*3/UL (ref 4.4–11.3)

## 2024-05-07 PROCEDURE — 1036F TOBACCO NON-USER: CPT | Performed by: INTERNAL MEDICINE

## 2024-05-07 PROCEDURE — 1159F MED LIST DOCD IN RCRD: CPT | Performed by: INTERNAL MEDICINE

## 2024-05-07 PROCEDURE — G0439 PPPS, SUBSEQ VISIT: HCPCS | Performed by: INTERNAL MEDICINE

## 2024-05-07 PROCEDURE — 3075F SYST BP GE 130 - 139MM HG: CPT | Performed by: INTERNAL MEDICINE

## 2024-05-07 PROCEDURE — 1123F ACP DISCUSS/DSCN MKR DOCD: CPT | Performed by: INTERNAL MEDICINE

## 2024-05-07 PROCEDURE — 36415 COLL VENOUS BLD VENIPUNCTURE: CPT

## 2024-05-07 PROCEDURE — 80053 COMPREHEN METABOLIC PANEL: CPT

## 2024-05-07 PROCEDURE — G0103 PSA SCREENING: HCPCS

## 2024-05-07 PROCEDURE — 3078F DIAST BP <80 MM HG: CPT | Performed by: INTERNAL MEDICINE

## 2024-05-07 PROCEDURE — 84443 ASSAY THYROID STIM HORMONE: CPT

## 2024-05-07 PROCEDURE — 1158F ADVNC CARE PLAN TLK DOCD: CPT | Performed by: INTERNAL MEDICINE

## 2024-05-07 PROCEDURE — 85027 COMPLETE CBC AUTOMATED: CPT

## 2024-05-07 PROCEDURE — 1170F FXNL STATUS ASSESSED: CPT | Performed by: INTERNAL MEDICINE

## 2024-05-07 PROCEDURE — 80061 LIPID PANEL: CPT

## 2024-05-07 PROCEDURE — 1160F RVW MEDS BY RX/DR IN RCRD: CPT | Performed by: INTERNAL MEDICINE

## 2024-05-07 RX ORDER — LISINOPRIL 40 MG/1
40 TABLET ORAL DAILY
Qty: 90 TABLET | Refills: 3 | Status: SHIPPED | OUTPATIENT
Start: 2024-05-07

## 2024-05-07 RX ORDER — CARVEDILOL 6.25 MG/1
6.25 TABLET ORAL
Qty: 180 TABLET | Refills: 3 | Status: SHIPPED | OUTPATIENT
Start: 2024-05-07 | End: 2025-05-07

## 2024-05-07 RX ORDER — AMLODIPINE BESYLATE 5 MG/1
5 TABLET ORAL DAILY
Qty: 90 TABLET | Refills: 3 | Status: SHIPPED | OUTPATIENT
Start: 2024-05-07

## 2024-05-07 RX ORDER — ATORVASTATIN CALCIUM 40 MG/1
40 TABLET, FILM COATED ORAL NIGHTLY
Qty: 90 TABLET | Refills: 3 | Status: SHIPPED | OUTPATIENT
Start: 2024-05-07 | End: 2025-05-07

## 2024-05-07 ASSESSMENT — PATIENT HEALTH QUESTIONNAIRE - PHQ9
SUM OF ALL RESPONSES TO PHQ9 QUESTIONS 1 AND 2: 0
1. LITTLE INTEREST OR PLEASURE IN DOING THINGS: NOT AT ALL
2. FEELING DOWN, DEPRESSED OR HOPELESS: NOT AT ALL

## 2024-05-07 ASSESSMENT — ENCOUNTER SYMPTOMS
LOSS OF SENSATION IN FEET: 0
PALPITATIONS: 0
SHORTNESS OF BREATH: 0
DEPRESSION: 0
DIARRHEA: 0
WHEEZING: 0
OCCASIONAL FEELINGS OF UNSTEADINESS: 0
COUGH: 0
CONSTIPATION: 0
ABDOMINAL DISTENTION: 1

## 2024-05-07 ASSESSMENT — ACTIVITIES OF DAILY LIVING (ADL)
GROCERY_SHOPPING: INDEPENDENT
TAKING_MEDICATION: INDEPENDENT
DRESSING: INDEPENDENT
MANAGING_FINANCES: INDEPENDENT
DOING_HOUSEWORK: INDEPENDENT
BATHING: INDEPENDENT

## 2024-05-07 NOTE — PROGRESS NOTES
"Subjective   Patient ID: Adonay Betancourt is a 73 y.o. male who presents for [facility] and Medicare Annual Wellness Visit Subsequent ( Pt refused all vaccines.).    Overall doing well.  Patient is fairly active.  Denies any issues with CP,SOB or dizzy spells.  No issues with anxiety, depression or sleep related problems. Denies any issues with HA, numbness or tingling.  No issues or changes with bowel or bladder habits.    He admits to too much sugar and too much fried foods in his diet.      Review of Systems   Respiratory:  Negative for cough, shortness of breath and wheezing.    Cardiovascular:  Negative for chest pain and palpitations.   Gastrointestinal:  Positive for abdominal distention. Negative for constipation and diarrhea.   ROS is otherwise unremarkable.       Objective   /60 (BP Location: Left arm, Patient Position: Sitting, BP Cuff Size: Adult)   Pulse 80   Temp 36.2 °C (97.1 °F) (Tympanic)   Resp 14   Ht 1.727 m (5' 8\")   Wt 71.2 kg (157 lb)   SpO2 98%   BMI 23.87 kg/m²     Physical Exam  Vitals reviewed.   Constitutional:       Appearance: Normal appearance.   HENT:      Head: Normocephalic.   Cardiovascular:      Rate and Rhythm: Normal rate and regular rhythm.   Pulmonary:      Effort: Pulmonary effort is normal.      Breath sounds: Normal breath sounds.   Abdominal:      General: Abdomen is flat.      Palpations: Abdomen is soft.      Hernia: No hernia is present.   Musculoskeletal:         General: Normal range of motion.   Skin:     General: Skin is warm and dry.   Neurological:      General: No focal deficit present.      Mental Status: He is alert.   Psychiatric:         Mood and Affect: Mood normal.         Assessment/Plan   Problem List Items Addressed This Visit             ICD-10-CM    Protein-calorie malnutrition, unspecified severity (Multi) E46    Elevated blood sugar R73.9    Primary hypertension I10    Relevant Medications    atorvastatin (Lipitor) 40 mg tablet    carvedilol " (Coreg) 6.25 mg tablet    lisinopril 40 mg tablet    Pure hypercholesterolemia E78.00     Other Visit Diagnoses         Codes    Wellness examination    -  Primary Z00.00    Essential (primary) hypertension     I10    Relevant Medications    amLODIPine (Norvasc) 5 mg tablet    Periodic health assessment, general screening, adult     Z00.00        Physical exam is unremarkable.  We reviewed and discussed all the above.  We discussed current medications as well as most recent test results.  We discussed the importance and benefits of a healthy diet that is both low in sugars and low in saturated fats.  We reviewed and discussed the benefits of regular physical exercise especially when at or above a level of 150 minutes/week.  We also discussed the importance of stress management and good sleep hygiene.  Annual Wellness Visit questions and answers were reviewed and discussed including the importance of discussing end of life wishes as well as having a living will and health care power of .     Annual Wellness Visit questions and answers were reviewed and discussed including the importance of discussing end of life wishes as well as having a living will and health care power of .     We will continue to work on lifestyle improvements and follow-up in 6 months, sooner if any issues should arise.

## 2024-08-25 DIAGNOSIS — I10 PRIMARY HYPERTENSION: ICD-10-CM

## 2024-08-26 RX ORDER — ATORVASTATIN CALCIUM 40 MG/1
40 TABLET, FILM COATED ORAL NIGHTLY
Qty: 90 TABLET | Refills: 3 | Status: SHIPPED | OUTPATIENT
Start: 2024-08-26 | End: 2025-08-26

## 2024-08-26 RX ORDER — CARVEDILOL 6.25 MG/1
TABLET ORAL
Qty: 360 TABLET | Refills: 3 | Status: SHIPPED | OUTPATIENT
Start: 2024-08-26

## 2024-11-07 ENCOUNTER — APPOINTMENT (OUTPATIENT)
Dept: PRIMARY CARE | Facility: CLINIC | Age: 74
End: 2024-11-07
Payer: MEDICARE

## 2024-11-07 ENCOUNTER — LAB (OUTPATIENT)
Dept: LAB | Facility: LAB | Age: 74
End: 2024-11-07
Payer: MEDICARE

## 2024-11-07 VITALS
TEMPERATURE: 97.9 F | OXYGEN SATURATION: 98 % | HEART RATE: 82 BPM | BODY MASS INDEX: 22.43 KG/M2 | RESPIRATION RATE: 14 BRPM | WEIGHT: 148 LBS | SYSTOLIC BLOOD PRESSURE: 126 MMHG | HEIGHT: 68 IN | DIASTOLIC BLOOD PRESSURE: 82 MMHG

## 2024-11-07 DIAGNOSIS — K40.90 RIGHT INGUINAL HERNIA: ICD-10-CM

## 2024-11-07 DIAGNOSIS — D72.829 LEUKOCYTOSIS, UNSPECIFIED TYPE: ICD-10-CM

## 2024-11-07 DIAGNOSIS — R73.9 ELEVATED BLOOD SUGAR: ICD-10-CM

## 2024-11-07 DIAGNOSIS — I10 PRIMARY HYPERTENSION: Primary | ICD-10-CM

## 2024-11-07 LAB
ANION GAP SERPL CALC-SCNC: 13 MMOL/L (ref 10–20)
BASOPHILS # BLD AUTO: 0.06 X10*3/UL (ref 0–0.1)
BASOPHILS NFR BLD AUTO: 0.5 %
BUN SERPL-MCNC: 10 MG/DL (ref 6–23)
CALCIUM SERPL-MCNC: 9.2 MG/DL (ref 8.6–10.3)
CHLORIDE SERPL-SCNC: 103 MMOL/L (ref 98–107)
CO2 SERPL-SCNC: 28 MMOL/L (ref 21–32)
CREAT SERPL-MCNC: 1.08 MG/DL (ref 0.5–1.3)
EGFRCR SERPLBLD CKD-EPI 2021: 71 ML/MIN/1.73M*2
EOSINOPHIL # BLD AUTO: 0 X10*3/UL (ref 0–0.4)
EOSINOPHIL NFR BLD AUTO: 0 %
ERYTHROCYTE [DISTWIDTH] IN BLOOD BY AUTOMATED COUNT: 13.7 % (ref 11.5–14.5)
GLUCOSE SERPL-MCNC: 130 MG/DL (ref 74–99)
HCT VFR BLD AUTO: 41.8 % (ref 41–52)
HGB BLD-MCNC: 14.3 G/DL (ref 13.5–17.5)
IMM GRANULOCYTES # BLD AUTO: 0.03 X10*3/UL (ref 0–0.5)
IMM GRANULOCYTES NFR BLD AUTO: 0.3 % (ref 0–0.9)
LYMPHOCYTES # BLD AUTO: 1.67 X10*3/UL (ref 0.8–3)
LYMPHOCYTES NFR BLD AUTO: 14.5 %
MCH RBC QN AUTO: 34.5 PG (ref 26–34)
MCHC RBC AUTO-ENTMCNC: 34.2 G/DL (ref 32–36)
MCV RBC AUTO: 101 FL (ref 80–100)
MONOCYTES # BLD AUTO: 0.87 X10*3/UL (ref 0.05–0.8)
MONOCYTES NFR BLD AUTO: 7.5 %
NEUTROPHILS # BLD AUTO: 8.92 X10*3/UL (ref 1.6–5.5)
NEUTROPHILS NFR BLD AUTO: 77.2 %
NRBC BLD-RTO: 0 /100 WBCS (ref 0–0)
PLATELET # BLD AUTO: 275 X10*3/UL (ref 150–450)
POTASSIUM SERPL-SCNC: 4.2 MMOL/L (ref 3.5–5.3)
RBC # BLD AUTO: 4.15 X10*6/UL (ref 4.5–5.9)
SODIUM SERPL-SCNC: 140 MMOL/L (ref 136–145)
WBC # BLD AUTO: 11.6 X10*3/UL (ref 4.4–11.3)

## 2024-11-07 PROCEDURE — 36415 COLL VENOUS BLD VENIPUNCTURE: CPT

## 2024-11-07 PROCEDURE — 99214 OFFICE O/P EST MOD 30 MIN: CPT | Performed by: INTERNAL MEDICINE

## 2024-11-07 PROCEDURE — 1123F ACP DISCUSS/DSCN MKR DOCD: CPT | Performed by: INTERNAL MEDICINE

## 2024-11-07 PROCEDURE — 1158F ADVNC CARE PLAN TLK DOCD: CPT | Performed by: INTERNAL MEDICINE

## 2024-11-07 PROCEDURE — 1160F RVW MEDS BY RX/DR IN RCRD: CPT | Performed by: INTERNAL MEDICINE

## 2024-11-07 PROCEDURE — 1159F MED LIST DOCD IN RCRD: CPT | Performed by: INTERNAL MEDICINE

## 2024-11-07 PROCEDURE — 80048 BASIC METABOLIC PNL TOTAL CA: CPT

## 2024-11-07 PROCEDURE — 83036 HEMOGLOBIN GLYCOSYLATED A1C: CPT

## 2024-11-07 PROCEDURE — 3079F DIAST BP 80-89 MM HG: CPT | Performed by: INTERNAL MEDICINE

## 2024-11-07 PROCEDURE — 3074F SYST BP LT 130 MM HG: CPT | Performed by: INTERNAL MEDICINE

## 2024-11-07 PROCEDURE — 1036F TOBACCO NON-USER: CPT | Performed by: INTERNAL MEDICINE

## 2024-11-07 PROCEDURE — 85025 COMPLETE CBC W/AUTO DIFF WBC: CPT

## 2024-11-07 ASSESSMENT — ENCOUNTER SYMPTOMS
COUGH: 0
CONSTIPATION: 0
ABDOMINAL DISTENTION: 1
PALPITATIONS: 0
WHEEZING: 0
NAUSEA: 0
ABDOMINAL PAIN: 0

## 2024-11-07 NOTE — PROGRESS NOTES
"Subjective   Patient ID: Adonay Betancourt is a 74 y.o. male who presents for Hypertension.    Overall he is feeling well.  No issues with CP, SOB or dizzy spells.    He does complain of an intermittent bulge in his right groin. No pain currently.    We reviewed and discussed his current medications as well as his most recent blood tests.      Review of Systems   Respiratory:  Negative for cough and wheezing.    Cardiovascular:  Negative for chest pain and palpitations.   Gastrointestinal:  Positive for abdominal distention. Negative for abdominal pain, constipation and nausea.        X 1 month       Objective   /82 (BP Location: Left arm, Patient Position: Sitting, BP Cuff Size: Adult)   Pulse 82   Temp 36.6 °C (97.9 °F) (Tympanic)   Resp 14   Ht 1.727 m (5' 8\")   Wt 67.1 kg (148 lb)   SpO2 98%   BMI 22.50 kg/m²     Physical Exam  Vitals reviewed.   Constitutional:       Appearance: Normal appearance.   HENT:      Head: Normocephalic.   Cardiovascular:      Rate and Rhythm: Normal rate.   Pulmonary:      Effort: Pulmonary effort is normal.   Abdominal:      Palpations: Abdomen is soft.      Hernia: A hernia is present.      Comments: When coughing there is a soft bulge at inguinal ring opening.  Self reduces.  No significant pain.     Musculoskeletal:         General: Normal range of motion.   Neurological:      General: No focal deficit present.      Mental Status: He is alert.   Psychiatric:         Mood and Affect: Mood normal.         Assessment/Plan   Problem List Items Addressed This Visit             ICD-10-CM    Elevated blood sugar R73.9    Relevant Orders    Basic Metabolic Panel (Completed)    Hemoglobin A1C    Primary hypertension - Primary I10     Other Visit Diagnoses         Codes    Leukocytosis, unspecified type     D72.829    Relevant Orders    CBC and Auto Differential (Completed)    Right inguinal hernia     K40.90        We discussed the above.    HTN controlled.    We will follow " CBC if recheck.  We discussed the benefits of low sugar and low carbohydrate diet (avoiding sugars, sweets, desserts, pop, juice, milk and minimizing foods such as breads, pasta/noodles, rice, cereal etc)   Discussed hernia.  Discussed signs and symptoms of emergency.  He does not wish for surgical consult currently.    Follow up in 6 months - sooner if any issues.

## 2024-11-08 DIAGNOSIS — Z00.00 PERIODIC HEALTH ASSESSMENT, GENERAL SCREENING, ADULT: ICD-10-CM

## 2024-11-08 DIAGNOSIS — Z12.5 SCREENING FOR PROSTATE CANCER: Primary | ICD-10-CM

## 2024-11-08 DIAGNOSIS — I10 PRIMARY HYPERTENSION: ICD-10-CM

## 2024-11-08 DIAGNOSIS — R73.9 ELEVATED BLOOD SUGAR: ICD-10-CM

## 2024-11-08 DIAGNOSIS — R53.83 OTHER FATIGUE: ICD-10-CM

## 2024-11-08 DIAGNOSIS — D72.829 LEUKOCYTOSIS, UNSPECIFIED TYPE: ICD-10-CM

## 2024-11-08 DIAGNOSIS — E78.00 PURE HYPERCHOLESTEROLEMIA: ICD-10-CM

## 2024-11-08 LAB
EST. AVERAGE GLUCOSE BLD GHB EST-MCNC: 105 MG/DL
HBA1C MFR BLD: 5.3 %

## 2025-05-08 ENCOUNTER — APPOINTMENT (OUTPATIENT)
Dept: PRIMARY CARE | Facility: CLINIC | Age: 75
End: 2025-05-08
Payer: MEDICARE

## 2025-05-08 VITALS
WEIGHT: 150 LBS | DIASTOLIC BLOOD PRESSURE: 60 MMHG | TEMPERATURE: 97.3 F | SYSTOLIC BLOOD PRESSURE: 110 MMHG | OXYGEN SATURATION: 99 % | RESPIRATION RATE: 14 BRPM | HEART RATE: 65 BPM | BODY MASS INDEX: 22.73 KG/M2 | HEIGHT: 68 IN

## 2025-05-08 DIAGNOSIS — G62.9 NEUROPATHY: ICD-10-CM

## 2025-05-08 DIAGNOSIS — Z00.00 ROUTINE GENERAL MEDICAL EXAMINATION AT HEALTH CARE FACILITY: Primary | ICD-10-CM

## 2025-05-08 DIAGNOSIS — R73.9 ELEVATED BLOOD SUGAR: ICD-10-CM

## 2025-05-08 DIAGNOSIS — I10 PRIMARY HYPERTENSION: ICD-10-CM

## 2025-05-08 DIAGNOSIS — N52.9 ERECTILE DYSFUNCTION, UNSPECIFIED ERECTILE DYSFUNCTION TYPE: ICD-10-CM

## 2025-05-08 DIAGNOSIS — D72.829 LEUKOCYTOSIS, UNSPECIFIED TYPE: ICD-10-CM

## 2025-05-08 DIAGNOSIS — K40.90 RIGHT INGUINAL HERNIA: ICD-10-CM

## 2025-05-08 DIAGNOSIS — I10 ESSENTIAL (PRIMARY) HYPERTENSION: ICD-10-CM

## 2025-05-08 DIAGNOSIS — Z13.220 SCREENING FOR HYPERLIPIDEMIA: ICD-10-CM

## 2025-05-08 DIAGNOSIS — E78.00 PURE HYPERCHOLESTEROLEMIA: ICD-10-CM

## 2025-05-08 PROCEDURE — 3078F DIAST BP <80 MM HG: CPT | Performed by: INTERNAL MEDICINE

## 2025-05-08 PROCEDURE — 3074F SYST BP LT 130 MM HG: CPT | Performed by: INTERNAL MEDICINE

## 2025-05-08 PROCEDURE — 1123F ACP DISCUSS/DSCN MKR DOCD: CPT | Performed by: INTERNAL MEDICINE

## 2025-05-08 PROCEDURE — G0439 PPPS, SUBSEQ VISIT: HCPCS | Performed by: INTERNAL MEDICINE

## 2025-05-08 PROCEDURE — 1160F RVW MEDS BY RX/DR IN RCRD: CPT | Performed by: INTERNAL MEDICINE

## 2025-05-08 PROCEDURE — 1036F TOBACCO NON-USER: CPT | Performed by: INTERNAL MEDICINE

## 2025-05-08 PROCEDURE — 99397 PER PM REEVAL EST PAT 65+ YR: CPT | Performed by: INTERNAL MEDICINE

## 2025-05-08 PROCEDURE — 1158F ADVNC CARE PLAN TLK DOCD: CPT | Performed by: INTERNAL MEDICINE

## 2025-05-08 PROCEDURE — 1159F MED LIST DOCD IN RCRD: CPT | Performed by: INTERNAL MEDICINE

## 2025-05-08 PROCEDURE — 1170F FXNL STATUS ASSESSED: CPT | Performed by: INTERNAL MEDICINE

## 2025-05-08 RX ORDER — ATORVASTATIN CALCIUM 40 MG/1
40 TABLET, FILM COATED ORAL NIGHTLY
Qty: 90 TABLET | Refills: 3 | Status: SHIPPED | OUTPATIENT
Start: 2025-05-08 | End: 2026-05-08

## 2025-05-08 RX ORDER — CARVEDILOL 12.5 MG/1
12.5 TABLET ORAL 2 TIMES DAILY
Qty: 180 TABLET | Refills: 3 | Status: SHIPPED | OUTPATIENT
Start: 2025-05-08

## 2025-05-08 RX ORDER — SILDENAFIL 50 MG/1
50 TABLET, FILM COATED ORAL AS NEEDED
Qty: 10 TABLET | Refills: 3 | Status: SHIPPED | OUTPATIENT
Start: 2025-05-08 | End: 2026-05-08

## 2025-05-08 RX ORDER — LISINOPRIL 40 MG/1
40 TABLET ORAL DAILY
Qty: 90 TABLET | Refills: 3 | Status: SHIPPED | OUTPATIENT
Start: 2025-05-08

## 2025-05-08 RX ORDER — AMLODIPINE BESYLATE 5 MG/1
5 TABLET ORAL DAILY
Qty: 90 TABLET | Refills: 3 | Status: SHIPPED | OUTPATIENT
Start: 2025-05-08

## 2025-05-08 ASSESSMENT — ENCOUNTER SYMPTOMS
PALPITATIONS: 0
ABDOMINAL PAIN: 1
COUGH: 0
DIARRHEA: 0
WHEEZING: 0
HYPERTENSION: 1
SHORTNESS OF BREATH: 0
NAUSEA: 0
CONSTIPATION: 0

## 2025-05-08 ASSESSMENT — PATIENT HEALTH QUESTIONNAIRE - PHQ9
2. FEELING DOWN, DEPRESSED OR HOPELESS: NOT AT ALL
1. LITTLE INTEREST OR PLEASURE IN DOING THINGS: NOT AT ALL
SUM OF ALL RESPONSES TO PHQ9 QUESTIONS 1 AND 2: 0

## 2025-05-08 ASSESSMENT — ACTIVITIES OF DAILY LIVING (ADL)
DRESSING: INDEPENDENT
DOING_HOUSEWORK: INDEPENDENT
BATHING: INDEPENDENT
GROCERY_SHOPPING: INDEPENDENT
TAKING_MEDICATION: INDEPENDENT
MANAGING_FINANCES: INDEPENDENT

## 2025-05-08 NOTE — PROGRESS NOTES
"Subjective   Patient ID: Adonay Betancourt is a 74 y.o. male who presents for Hypertension and Medicare Annual Wellness Visit Subsequent.    Hypertension  Pertinent negatives include no chest pain, palpitations or shortness of breath.        Review of Systems   Respiratory:  Negative for cough, shortness of breath and wheezing.    Cardiovascular:  Negative for chest pain and palpitations.   Gastrointestinal:  Positive for abdominal pain. Negative for constipation, diarrhea and nausea.        X  6 months  on/off       Objective   /60 (BP Location: Left arm, Patient Position: Sitting, BP Cuff Size: Adult)   Pulse 65   Temp 36.3 °C (97.3 °F) (Tympanic)   Resp 14   Ht 1.727 m (5' 8\")   Wt 68 kg (150 lb)   SpO2 99%   BMI 22.81 kg/m²     Physical Exam    Assessment/Plan          "

## 2025-05-08 NOTE — ASSESSMENT & PLAN NOTE
Orders:    atorvastatin (Lipitor) 40 mg tablet; Take 1 tablet (40 mg) by mouth once daily at bedtime.    carvedilol (Coreg) 12.5 mg tablet; Take 1 tablet (12.5 mg) by mouth 2 times a day.    lisinopril 40 mg tablet; Take 1 tablet (40 mg) by mouth once daily.

## 2025-05-08 NOTE — PROGRESS NOTES
"Subjective   Reason for Visit: Adonay Betancourt is an 74 y.o. male here for a Medicare Wellness visit.     Reviewed all medications by prescribing practitioner or clinical pharmacist (such as prescriptions, OTCs, herbal therapies and supplements) and documented in the medical record.    Overall doing well.  Patient is fairly active.  Denies any issues with CP,SOB or dizzy spells.  No issues with anxiety, depression or sleep related problems. Denies any issues with HA, numbness or tingling.  No issues or changes with bowel or bladder habits.      Patient Care Team:  Ced Sun DO as PCP - General  Ced Sun DO as PCP - [employer] PCP     Review of Systems  ROS is otherwise unremarkable.       Objective   Vitals:  /60 (BP Location: Left arm, Patient Position: Sitting, BP Cuff Size: Adult)   Pulse 65   Temp 36.3 °C (97.3 °F) (Tympanic)   Resp 14   Ht 1.727 m (5' 8\")   Wt 68 kg (150 lb)   SpO2 99%   BMI 22.81 kg/m²       Physical Exam  Vitals reviewed.   Constitutional:       Appearance: Normal appearance.   HENT:      Head: Normocephalic.   Cardiovascular:      Rate and Rhythm: Normal rate and regular rhythm.   Pulmonary:      Effort: Pulmonary effort is normal.      Breath sounds: Normal breath sounds.   Abdominal:      Palpations: Abdomen is soft.      Tenderness: There is no abdominal tenderness.      Hernia: A hernia is present.   Musculoskeletal:         General: Normal range of motion.   Neurological:      General: No focal deficit present.      Mental Status: He is alert.   Psychiatric:         Mood and Affect: Mood normal.         Assessment & Plan  Essential (primary) hypertension    Orders:    amLODIPine (Norvasc) 5 mg tablet; Take 1 tablet (5 mg) by mouth once daily.    Primary hypertension    Orders:    atorvastatin (Lipitor) 40 mg tablet; Take 1 tablet (40 mg) by mouth once daily at bedtime.    carvedilol (Coreg) 12.5 mg tablet; Take 1 tablet (12.5 mg) by " mouth 2 times a day.    lisinopril 40 mg tablet; Take 1 tablet (40 mg) by mouth once daily.    Routine general medical examination at health care facility         Right inguinal hernia    Orders:    Referral to General Surgery; Future    Elevated blood sugar    Orders:    Hemoglobin A1C; Future    Pure hypercholesterolemia    Orders:    Comprehensive Metabolic Panel; Future    Thyroid Stimulating Hormone; Future    Screening for hyperlipidemia    Orders:    Lipid Panel; Future    Erectile dysfunction, unspecified erectile dysfunction type    Orders:    sildenafil (Viagra) 50 mg tablet; Take 1 tablet (50 mg) by mouth if needed for erectile dysfunction.    Leukocytosis, unspecified type    Orders:    CBC; Future    Neuropathy    Orders:    Thyroid Stimulating Hormone; Future    Physical exam is unremarkable.  We reviewed and discussed all the above.  We discussed current medications as well as most recent test results.  He does have a non tender reducible right inguinal hernia.  He would like to see surgery as it is getting bigger.    We discussed the importance and benefits of a healthy diet that is both low in sugars and low in saturated fats.  We reviewed and discussed the benefits of regular physical exercise especially when at or above a level of 150 minutes/week.  We also discussed the importance of stress management and good sleep hygiene.  Annual Wellness Visit questions and answers were reviewed and discussed including the importance of discussing end of life wishes as well as having a living will and health care power of .     We will continue to work on lifestyle improvements and follow-up in 3-4 months, sooner if any issues should arise.

## 2025-05-22 ENCOUNTER — APPOINTMENT (OUTPATIENT)
Dept: SURGERY | Facility: CLINIC | Age: 75
End: 2025-05-22
Payer: MEDICARE

## 2025-05-22 VITALS
WEIGHT: 149 LBS | DIASTOLIC BLOOD PRESSURE: 77 MMHG | SYSTOLIC BLOOD PRESSURE: 126 MMHG | OXYGEN SATURATION: 98 % | HEIGHT: 68 IN | HEART RATE: 70 BPM | BODY MASS INDEX: 22.58 KG/M2

## 2025-05-22 DIAGNOSIS — K40.90 RIGHT INGUINAL HERNIA: Primary | ICD-10-CM

## 2025-05-22 PROCEDURE — 3078F DIAST BP <80 MM HG: CPT | Performed by: SURGERY

## 2025-05-22 PROCEDURE — 1036F TOBACCO NON-USER: CPT | Performed by: SURGERY

## 2025-05-22 PROCEDURE — 99203 OFFICE O/P NEW LOW 30 MIN: CPT | Performed by: SURGERY

## 2025-05-22 PROCEDURE — 3074F SYST BP LT 130 MM HG: CPT | Performed by: SURGERY

## 2025-05-22 PROCEDURE — 1159F MED LIST DOCD IN RCRD: CPT | Performed by: SURGERY

## 2025-05-22 RX ORDER — GABAPENTIN 300 MG/1
600 CAPSULE ORAL ONCE
OUTPATIENT
Start: 2025-05-22 | End: 2025-05-22

## 2025-05-22 RX ORDER — HEPARIN SODIUM 5000 [USP'U]/ML
5000 INJECTION, SOLUTION INTRAVENOUS; SUBCUTANEOUS ONCE
OUTPATIENT
Start: 2025-05-22 | End: 2025-05-22

## 2025-05-22 RX ORDER — CEFAZOLIN SODIUM 2 G/100ML
2 INJECTION, SOLUTION INTRAVENOUS ONCE
OUTPATIENT
Start: 2025-05-22 | End: 2025-05-22

## 2025-05-22 ASSESSMENT — ENCOUNTER SYMPTOMS: ABDOMINAL DISTENTION: 1

## 2025-05-22 NOTE — PROGRESS NOTES
Subjective   Patient ID: Adonay Betancourt is a 74 y.o. male who presents for Follow-up (Hernia consult. ).  HPI  74-year-old extremely pleasant male with 6-month history of right groin bulge with discomfort no obstructive symptoms.  No previous surgeries  Review of Systems   Gastrointestinal:  Positive for abdominal distention.   Genitourinary:  Positive for scrotal swelling.   All other systems reviewed and are negative.      Objective   Physical Exam  Abdominal:      Comments: Testes soft without masses large right inguinal scrotal hernia almost completely reducible no skin changes       Physical Exam  Constitutional:       Appearance: Normal appearance.   HENT:      Head: Normocephalic and atraumatic.      Mouth/Throat:      Pharynx: Oropharynx is clear.   Eyes:      Pupils: Pupils are equal, round, and reactive to light.   Cardiovascular:      Rate and Rhythm: Normal rate and regular rhythm.   Pulmonary:      Effort: Pulmonary effort is normal.      Breath sounds: Normal breath sounds.   Abdominal:      General: Abdomen is flat. Bowel sounds are normal.      Palpations: Abdomen is soft.   Musculoskeletal:         General: Normal range of motion.      Cervical back: Normal range of motion.   Skin:     General: Skin is warm.   Neurological:      General: No focal deficit present.      Mental Status: She is alert. Mental status is at baseline.   Psychiatric:         Mood and Affect: Mood normal.     Assessment/Plan   Large right inguinal scrotal hernia symptoms of increased and patient is at risk for incarceration and other complications discussed in detail with the patient recommend repair patient would be a suitable candidate for robotic assisted laparoscopic right inguinal hernia repair with mesh possible open the procedure risks benefits alternatives discussed in detail with the patient and he agrees to proceed possibility of open surgery infection bleeding chronic groin pain of up to 10% reviewed with the  patient         Michael Woods MD 05/22/25 9:54 AM

## 2025-06-08 DIAGNOSIS — I10 PRIMARY HYPERTENSION: ICD-10-CM

## 2025-06-09 RX ORDER — LISINOPRIL 40 MG/1
40 TABLET ORAL DAILY
Qty: 90 TABLET | Refills: 3 | Status: SHIPPED | OUTPATIENT
Start: 2025-06-09

## 2025-07-03 ENCOUNTER — APPOINTMENT (OUTPATIENT)
Dept: PRIMARY CARE | Facility: CLINIC | Age: 75
End: 2025-07-03
Payer: MEDICARE

## 2025-07-03 VITALS
DIASTOLIC BLOOD PRESSURE: 70 MMHG | OXYGEN SATURATION: 97 % | HEART RATE: 87 BPM | BODY MASS INDEX: 21.74 KG/M2 | RESPIRATION RATE: 18 BRPM | SYSTOLIC BLOOD PRESSURE: 122 MMHG | WEIGHT: 143 LBS | TEMPERATURE: 98 F

## 2025-07-03 DIAGNOSIS — R73.9 ELEVATED BLOOD SUGAR: ICD-10-CM

## 2025-07-03 DIAGNOSIS — I10 PRIMARY HYPERTENSION: Primary | ICD-10-CM

## 2025-07-03 DIAGNOSIS — K40.90 RIGHT INGUINAL HERNIA: ICD-10-CM

## 2025-07-03 DIAGNOSIS — E44.1 MILD PROTEIN-CALORIE MALNUTRITION (MULTI): ICD-10-CM

## 2025-07-03 PROCEDURE — 1160F RVW MEDS BY RX/DR IN RCRD: CPT | Performed by: INTERNAL MEDICINE

## 2025-07-03 PROCEDURE — 1159F MED LIST DOCD IN RCRD: CPT | Performed by: INTERNAL MEDICINE

## 2025-07-03 PROCEDURE — 3074F SYST BP LT 130 MM HG: CPT | Performed by: INTERNAL MEDICINE

## 2025-07-03 PROCEDURE — 99214 OFFICE O/P EST MOD 30 MIN: CPT | Performed by: INTERNAL MEDICINE

## 2025-07-03 PROCEDURE — 93000 ELECTROCARDIOGRAM COMPLETE: CPT | Performed by: INTERNAL MEDICINE

## 2025-07-03 PROCEDURE — 3078F DIAST BP <80 MM HG: CPT | Performed by: INTERNAL MEDICINE

## 2025-07-03 RX ORDER — LISINOPRIL 40 MG/1
40 TABLET ORAL DAILY
Qty: 90 TABLET | Refills: 3 | Status: SHIPPED | OUTPATIENT
Start: 2025-07-03

## 2025-07-03 NOTE — PROGRESS NOTES
Subjective   Patient ID: Adonay Betancourt is a 75 y.o. male who presents for Pre-op Exam.    PT here for pre-op appointment for inguinal hernia repair on 7/14/2025. Surgery is being performed by Dr. Michael Woods.    Otherwise has been feeling well.  He is fairly active.  Goes to the park every morning with his dog for walk.  Works out in his garden too.    No issues with CP, SOB or dizzy spells.      Review of Systems    Objective   /70   Pulse 87   Temp 36.7 °C (98 °F)   Resp 18   Wt 64.9 kg (143 lb)   SpO2 97%   BMI 21.74 kg/m²     Physical Exam  Vitals reviewed.   Constitutional:       Appearance: Normal appearance.   HENT:      Head: Normocephalic.   Cardiovascular:      Rate and Rhythm: Normal rate and regular rhythm.   Pulmonary:      Effort: Pulmonary effort is normal.      Breath sounds: Normal breath sounds.   Musculoskeletal:         General: Normal range of motion.   Neurological:      General: No focal deficit present.      Mental Status: He is alert.   Psychiatric:         Mood and Affect: Mood normal.         Assessment/Plan   Problem List Items Addressed This Visit           ICD-10-CM    Elevated blood sugar R73.9    Primary hypertension - Primary I10    Relevant Orders    ECG 12 Lead    Protime-INR    Right inguinal hernia K40.90    Relevant Orders    Protime-INR     Other Visit Diagnoses         Codes      Mild protein-calorie malnutrition (Multi)     E44.1    Relevant Orders    Protime-INR        Preop exam for inguinal hernia repair.    Cardiac risk factors are controlled.  He is acitve and meets a MET of 4 or more without any cardiac or cardiac equivalent symptoms.    His EKG is unremarkable.    We will check blood tests to further assess.    So long as these blood test look ok he would be considered an acceptable risk to proceed with hernia repair.

## 2025-07-09 ENCOUNTER — TELEPHONE (OUTPATIENT)
Dept: PRIMARY CARE | Facility: CLINIC | Age: 75
End: 2025-07-09
Payer: MEDICARE

## 2025-07-09 NOTE — TELEPHONE ENCOUNTER
Pt called back, I informed him message was sent this morning and that staff is with pts but they will get back to him.

## 2025-08-06 ENCOUNTER — HOSPITAL ENCOUNTER (OUTPATIENT)
Dept: RADIOLOGY | Facility: HOSPITAL | Age: 75
Discharge: HOME | End: 2025-08-06
Payer: MEDICARE

## 2025-08-06 DIAGNOSIS — K40.90 RIGHT INGUINAL HERNIA: ICD-10-CM

## 2025-08-06 PROCEDURE — 71046 X-RAY EXAM CHEST 2 VIEWS: CPT

## 2025-08-07 LAB
ALBUMIN SERPL-MCNC: 3.8 G/DL (ref 3.6–5.1)
ALP SERPL-CCNC: 95 U/L (ref 35–144)
ALT SERPL-CCNC: 19 U/L (ref 9–46)
ANION GAP SERPL CALCULATED.4IONS-SCNC: 10 MMOL/L (CALC) (ref 7–17)
AST SERPL-CCNC: 22 U/L (ref 10–35)
BILIRUB SERPL-MCNC: 0.7 MG/DL (ref 0.2–1.2)
BUN SERPL-MCNC: 7 MG/DL (ref 7–25)
CALCIUM SERPL-MCNC: 8.8 MG/DL (ref 8.6–10.3)
CHLORIDE SERPL-SCNC: 104 MMOL/L (ref 98–110)
CHOLEST SERPL-MCNC: 106 MG/DL
CHOLEST/HDLC SERPL: 2 (CALC)
CO2 SERPL-SCNC: 23 MMOL/L (ref 20–32)
CREAT SERPL-MCNC: 0.93 MG/DL (ref 0.7–1.28)
EGFRCR SERPLBLD CKD-EPI 2021: 85 ML/MIN/1.73M2
ERYTHROCYTE [DISTWIDTH] IN BLOOD BY AUTOMATED COUNT: 13.2 % (ref 11–15)
EST. AVERAGE GLUCOSE BLD GHB EST-MCNC: 114 MG/DL
EST. AVERAGE GLUCOSE BLD GHB EST-SCNC: 6.3 MMOL/L
GLUCOSE SERPL-MCNC: 95 MG/DL (ref 65–99)
HBA1C MFR BLD: 5.6 %
HCT VFR BLD AUTO: 38.5 % (ref 38.5–50)
HDLC SERPL-MCNC: 53 MG/DL
HGB BLD-MCNC: 12.7 G/DL (ref 13.2–17.1)
LDLC SERPL CALC-MCNC: 34 MG/DL (CALC)
MCH RBC QN AUTO: 33.8 PG (ref 27–33)
MCHC RBC AUTO-ENTMCNC: 33 G/DL (ref 32–36)
MCV RBC AUTO: 102.4 FL (ref 80–100)
NONHDLC SERPL-MCNC: 53 MG/DL (CALC)
PLATELET # BLD AUTO: 286 THOUSAND/UL (ref 140–400)
PMV BLD REES-ECKER: 10.2 FL (ref 7.5–12.5)
POTASSIUM SERPL-SCNC: 3.9 MMOL/L (ref 3.5–5.3)
PROT SERPL-MCNC: 7.3 G/DL (ref 6.1–8.1)
RBC # BLD AUTO: 3.76 MILLION/UL (ref 4.2–5.8)
SODIUM SERPL-SCNC: 137 MMOL/L (ref 135–146)
TRIGL SERPL-MCNC: 111 MG/DL
TSH SERPL-ACNC: 0.6 MIU/L (ref 0.4–4.5)
WBC # BLD AUTO: 9 THOUSAND/UL (ref 3.8–10.8)

## 2025-08-08 DIAGNOSIS — R73.9 ELEVATED BLOOD SUGAR: ICD-10-CM

## 2025-08-08 DIAGNOSIS — D72.829 LEUKOCYTOSIS, UNSPECIFIED TYPE: ICD-10-CM

## 2025-08-08 DIAGNOSIS — Z13.220 SCREENING FOR HYPERLIPIDEMIA: ICD-10-CM

## 2025-08-08 DIAGNOSIS — E78.00 PURE HYPERCHOLESTEROLEMIA: ICD-10-CM

## 2025-08-08 DIAGNOSIS — G62.9 NEUROPATHY: ICD-10-CM

## 2025-08-11 ENCOUNTER — ANESTHESIA (OUTPATIENT)
Dept: OPERATING ROOM | Facility: HOSPITAL | Age: 75
End: 2025-08-11
Payer: MEDICARE

## 2025-08-11 ENCOUNTER — HOSPITAL ENCOUNTER (OUTPATIENT)
Facility: HOSPITAL | Age: 75
Setting detail: OUTPATIENT SURGERY
Discharge: HOME | End: 2025-08-11
Attending: SURGERY | Admitting: SURGERY
Payer: MEDICARE

## 2025-08-11 ENCOUNTER — ANESTHESIA EVENT (OUTPATIENT)
Dept: OPERATING ROOM | Facility: HOSPITAL | Age: 75
End: 2025-08-11
Payer: MEDICARE

## 2025-08-11 VITALS
SYSTOLIC BLOOD PRESSURE: 135 MMHG | WEIGHT: 139.33 LBS | BODY MASS INDEX: 19.95 KG/M2 | TEMPERATURE: 96.8 F | OXYGEN SATURATION: 99 % | HEIGHT: 70 IN | DIASTOLIC BLOOD PRESSURE: 88 MMHG | HEART RATE: 78 BPM | RESPIRATION RATE: 18 BRPM

## 2025-08-11 DIAGNOSIS — K40.90 RIGHT INGUINAL HERNIA: Primary | ICD-10-CM

## 2025-08-11 PROCEDURE — 2500000004 HC RX 250 GENERAL PHARMACY W/ HCPCS (ALT 636 FOR OP/ED): Performed by: SURGERY

## 2025-08-11 PROCEDURE — 2720000007 HC OR 272 NO HCPCS: Performed by: SURGERY

## 2025-08-11 PROCEDURE — 7100000002 HC RECOVERY ROOM TIME - EACH INCREMENTAL 1 MINUTE: Performed by: SURGERY

## 2025-08-11 PROCEDURE — 7100000009 HC PHASE TWO TIME - INITIAL BASE CHARGE: Performed by: SURGERY

## 2025-08-11 PROCEDURE — 7100000010 HC PHASE TWO TIME - EACH INCREMENTAL 1 MINUTE: Performed by: SURGERY

## 2025-08-11 PROCEDURE — 2500000001 HC RX 250 WO HCPCS SELF ADMINISTERED DRUGS (ALT 637 FOR MEDICARE OP): Performed by: SURGERY

## 2025-08-11 PROCEDURE — 3600000018 HC OR TIME - INITIAL BASE CHARGE - PROCEDURE LEVEL SIX: Performed by: SURGERY

## 2025-08-11 PROCEDURE — 3700000002 HC GENERAL ANESTHESIA TIME - EACH INCREMENTAL 1 MINUTE: Performed by: SURGERY

## 2025-08-11 PROCEDURE — 49650 LAP ING HERNIA REPAIR INIT: CPT | Performed by: SURGERY

## 2025-08-11 PROCEDURE — 2780000003 HC OR 278 NO HCPCS: Performed by: SURGERY

## 2025-08-11 PROCEDURE — 2500000005 HC RX 250 GENERAL PHARMACY W/O HCPCS: Performed by: SURGERY

## 2025-08-11 PROCEDURE — C1781 MESH (IMPLANTABLE): HCPCS | Performed by: SURGERY

## 2025-08-11 PROCEDURE — 3600000017 HC OR TIME - EACH INCREMENTAL 1 MINUTE - PROCEDURE LEVEL SIX: Performed by: SURGERY

## 2025-08-11 PROCEDURE — 96372 THER/PROPH/DIAG INJ SC/IM: CPT | Performed by: SURGERY

## 2025-08-11 PROCEDURE — 7100000001 HC RECOVERY ROOM TIME - INITIAL BASE CHARGE: Performed by: SURGERY

## 2025-08-11 PROCEDURE — S2900 ROBOTIC SURGICAL SYSTEM: HCPCS | Performed by: SURGERY

## 2025-08-11 PROCEDURE — 2500000004 HC RX 250 GENERAL PHARMACY W/ HCPCS (ALT 636 FOR OP/ED): Performed by: ANESTHESIOLOGY

## 2025-08-11 PROCEDURE — 3700000001 HC GENERAL ANESTHESIA TIME - INITIAL BASE CHARGE: Performed by: SURGERY

## 2025-08-11 DEVICE — MESH, PROGRIP LAP, 10 X 15 CM, FLATSHEET: Type: IMPLANTABLE DEVICE | Site: INGUINAL | Status: FUNCTIONAL

## 2025-08-11 RX ORDER — FENTANYL CITRATE 50 UG/ML
25 INJECTION, SOLUTION INTRAMUSCULAR; INTRAVENOUS EVERY 5 MIN PRN
Refills: 0 | Status: DISCONTINUED | OUTPATIENT
Start: 2025-08-11 | End: 2025-08-11 | Stop reason: HOSPADM

## 2025-08-11 RX ORDER — SODIUM CHLORIDE 0.9 G/100ML
INJECTION, SOLUTION IRRIGATION AS NEEDED
Status: DISCONTINUED | OUTPATIENT
Start: 2025-08-11 | End: 2025-08-11 | Stop reason: HOSPADM

## 2025-08-11 RX ORDER — GABAPENTIN 300 MG/1
600 CAPSULE ORAL ONCE
Status: COMPLETED | OUTPATIENT
Start: 2025-08-11 | End: 2025-08-11

## 2025-08-11 RX ORDER — DIPHENHYDRAMINE HYDROCHLORIDE 50 MG/ML
INJECTION, SOLUTION INTRAMUSCULAR; INTRAVENOUS AS NEEDED
Status: DISCONTINUED | OUTPATIENT
Start: 2025-08-11 | End: 2025-08-11

## 2025-08-11 RX ORDER — ROCURONIUM BROMIDE 10 MG/ML
INJECTION, SOLUTION INTRAVENOUS AS NEEDED
Status: DISCONTINUED | OUTPATIENT
Start: 2025-08-11 | End: 2025-08-11

## 2025-08-11 RX ORDER — MEPERIDINE HYDROCHLORIDE 25 MG/ML
12.5 INJECTION INTRAMUSCULAR; INTRAVENOUS; SUBCUTANEOUS EVERY 10 MIN PRN
Status: DISCONTINUED | OUTPATIENT
Start: 2025-08-11 | End: 2025-08-11 | Stop reason: HOSPADM

## 2025-08-11 RX ORDER — FENTANYL CITRATE 50 UG/ML
INJECTION, SOLUTION INTRAMUSCULAR; INTRAVENOUS AS NEEDED
Status: DISCONTINUED | OUTPATIENT
Start: 2025-08-11 | End: 2025-08-11

## 2025-08-11 RX ORDER — OXYCODONE HYDROCHLORIDE 5 MG/1
5 TABLET ORAL EVERY 4 HOURS PRN
Refills: 0 | Status: DISCONTINUED | OUTPATIENT
Start: 2025-08-11 | End: 2025-08-11 | Stop reason: HOSPADM

## 2025-08-11 RX ORDER — FENTANYL CITRATE 50 UG/ML
50 INJECTION, SOLUTION INTRAMUSCULAR; INTRAVENOUS EVERY 5 MIN PRN
Refills: 0 | Status: DISCONTINUED | OUTPATIENT
Start: 2025-08-11 | End: 2025-08-11 | Stop reason: HOSPADM

## 2025-08-11 RX ORDER — LIDOCAINE HYDROCHLORIDE 20 MG/ML
INJECTION, SOLUTION INFILTRATION; PERINEURAL AS NEEDED
Status: DISCONTINUED | OUTPATIENT
Start: 2025-08-11 | End: 2025-08-11

## 2025-08-11 RX ORDER — ONDANSETRON HYDROCHLORIDE 2 MG/ML
INJECTION, SOLUTION INTRAVENOUS AS NEEDED
Status: DISCONTINUED | OUTPATIENT
Start: 2025-08-11 | End: 2025-08-11

## 2025-08-11 RX ORDER — SODIUM CHLORIDE, SODIUM LACTATE, POTASSIUM CHLORIDE, CALCIUM CHLORIDE 600; 310; 30; 20 MG/100ML; MG/100ML; MG/100ML; MG/100ML
100 INJECTION, SOLUTION INTRAVENOUS CONTINUOUS
Status: DISCONTINUED | OUTPATIENT
Start: 2025-08-11 | End: 2025-08-11 | Stop reason: HOSPADM

## 2025-08-11 RX ORDER — PROPOFOL 10 MG/ML
INJECTION, EMULSION INTRAVENOUS AS NEEDED
Status: DISCONTINUED | OUTPATIENT
Start: 2025-08-11 | End: 2025-08-11

## 2025-08-11 RX ORDER — BUPIVACAINE HCL/EPINEPHRINE 0.25-.0005
VIAL (ML) INJECTION AS NEEDED
Status: DISCONTINUED | OUTPATIENT
Start: 2025-08-11 | End: 2025-08-11 | Stop reason: HOSPADM

## 2025-08-11 RX ORDER — PROCHLORPERAZINE EDISYLATE 5 MG/ML
5 INJECTION INTRAMUSCULAR; INTRAVENOUS ONCE AS NEEDED
Status: DISCONTINUED | OUTPATIENT
Start: 2025-08-11 | End: 2025-08-11 | Stop reason: HOSPADM

## 2025-08-11 RX ORDER — HEPARIN SODIUM 5000 [USP'U]/ML
5000 INJECTION, SOLUTION INTRAVENOUS; SUBCUTANEOUS ONCE
Status: COMPLETED | OUTPATIENT
Start: 2025-08-11 | End: 2025-08-11

## 2025-08-11 RX ORDER — SODIUM CHLORIDE, SODIUM LACTATE, POTASSIUM CHLORIDE, CALCIUM CHLORIDE 600; 310; 30; 20 MG/100ML; MG/100ML; MG/100ML; MG/100ML
50 INJECTION, SOLUTION INTRAVENOUS CONTINUOUS
Status: DISCONTINUED | OUTPATIENT
Start: 2025-08-11 | End: 2025-08-11 | Stop reason: HOSPADM

## 2025-08-11 RX ORDER — KETOROLAC TROMETHAMINE 30 MG/ML
INJECTION, SOLUTION INTRAMUSCULAR; INTRAVENOUS AS NEEDED
Status: DISCONTINUED | OUTPATIENT
Start: 2025-08-11 | End: 2025-08-11

## 2025-08-11 RX ORDER — ACETAMINOPHEN 325 MG/1
650 TABLET ORAL EVERY 4 HOURS PRN
Status: DISCONTINUED | OUTPATIENT
Start: 2025-08-11 | End: 2025-08-11 | Stop reason: HOSPADM

## 2025-08-11 RX ORDER — CEFAZOLIN SODIUM 2 G/50ML
2 SOLUTION INTRAVENOUS ONCE
Status: COMPLETED | OUTPATIENT
Start: 2025-08-11 | End: 2025-08-11

## 2025-08-11 RX ORDER — OXYCODONE HYDROCHLORIDE 5 MG/1
10 TABLET ORAL EVERY 4 HOURS PRN
Refills: 0 | Status: DISCONTINUED | OUTPATIENT
Start: 2025-08-11 | End: 2025-08-11 | Stop reason: HOSPADM

## 2025-08-11 RX ORDER — TRAMADOL HYDROCHLORIDE 50 MG/1
50 TABLET, FILM COATED ORAL EVERY 6 HOURS PRN
Qty: 12 TABLET | Refills: 0 | Status: SHIPPED | OUTPATIENT
Start: 2025-08-11 | End: 2025-08-16

## 2025-08-11 RX ADMIN — ROCURONIUM BROMIDE 50 MG: 10 SOLUTION INTRAVENOUS at 08:20

## 2025-08-11 RX ADMIN — LIDOCAINE HYDROCHLORIDE 80 MG: 20 INJECTION, SOLUTION INFILTRATION; PERINEURAL at 08:20

## 2025-08-11 RX ADMIN — ROCURONIUM BROMIDE 20 MG: 10 SOLUTION INTRAVENOUS at 10:37

## 2025-08-11 RX ADMIN — CEFAZOLIN SODIUM 2 G: 2 SOLUTION INTRAVENOUS at 08:28

## 2025-08-11 RX ADMIN — DIPHENHYDRAMINE HYDROCHLORIDE 12.5 MG: 50 INJECTION INTRAMUSCULAR; INTRAVENOUS at 08:28

## 2025-08-11 RX ADMIN — KETOROLAC TROMETHAMINE 30 MG: 30 INJECTION, SOLUTION INTRAMUSCULAR at 11:37

## 2025-08-11 RX ADMIN — FENTANYL CITRATE 50 MCG: 50 INJECTION, SOLUTION INTRAMUSCULAR; INTRAVENOUS at 08:20

## 2025-08-11 RX ADMIN — SUGAMMADEX 400 MG: 100 INJECTION, SOLUTION INTRAVENOUS at 11:41

## 2025-08-11 RX ADMIN — ROCURONIUM BROMIDE 30 MG: 10 SOLUTION INTRAVENOUS at 09:37

## 2025-08-11 RX ADMIN — HEPARIN SODIUM 5000 UNITS: 5000 INJECTION, SOLUTION INTRAVENOUS; SUBCUTANEOUS at 06:34

## 2025-08-11 RX ADMIN — FENTANYL CITRATE 50 MCG: 50 INJECTION, SOLUTION INTRAMUSCULAR; INTRAVENOUS at 11:46

## 2025-08-11 RX ADMIN — SODIUM CHLORIDE, POTASSIUM CHLORIDE, SODIUM LACTATE AND CALCIUM CHLORIDE 50 ML/HR: 600; 310; 30; 20 INJECTION, SOLUTION INTRAVENOUS at 06:33

## 2025-08-11 RX ADMIN — ONDANSETRON 4 MG: 2 INJECTION, SOLUTION INTRAMUSCULAR; INTRAVENOUS at 11:37

## 2025-08-11 RX ADMIN — ROCURONIUM BROMIDE 30 MG: 10 SOLUTION INTRAVENOUS at 11:28

## 2025-08-11 RX ADMIN — SODIUM CHLORIDE, POTASSIUM CHLORIDE, SODIUM LACTATE AND CALCIUM CHLORIDE: 600; 310; 30; 20 INJECTION, SOLUTION INTRAVENOUS at 11:42

## 2025-08-11 RX ADMIN — PROPOFOL 150 MG: 10 INJECTION, EMULSION INTRAVENOUS at 08:20

## 2025-08-11 RX ADMIN — GABAPENTIN 600 MG: 300 CAPSULE ORAL at 06:34

## 2025-08-11 RX ADMIN — DEXAMETHASONE SODIUM PHOSPHATE 4 MG: 4 INJECTION, SOLUTION INTRAMUSCULAR; INTRAVENOUS at 08:28

## 2025-08-11 ASSESSMENT — PAIN - FUNCTIONAL ASSESSMENT
PAIN_FUNCTIONAL_ASSESSMENT: VAS (VISUAL ANALOG SCALE)
PAIN_FUNCTIONAL_ASSESSMENT: 0-10

## 2025-08-11 ASSESSMENT — COLUMBIA-SUICIDE SEVERITY RATING SCALE - C-SSRS
6. HAVE YOU EVER DONE ANYTHING, STARTED TO DO ANYTHING, OR PREPARED TO DO ANYTHING TO END YOUR LIFE?: NO
2. HAVE YOU ACTUALLY HAD ANY THOUGHTS OF KILLING YOURSELF?: NO
1. IN THE PAST MONTH, HAVE YOU WISHED YOU WERE DEAD OR WISHED YOU COULD GO TO SLEEP AND NOT WAKE UP?: NO

## 2025-08-11 ASSESSMENT — PAIN SCALES - GENERAL
PAINLEVEL_OUTOF10: 0 - NO PAIN
PAIN_LEVEL: 2
PAINLEVEL_OUTOF10: 1

## 2025-08-11 ASSESSMENT — PAIN DESCRIPTION - DESCRIPTORS: DESCRIPTORS: SORE

## 2025-08-19 ENCOUNTER — APPOINTMENT (OUTPATIENT)
Dept: PRIMARY CARE | Facility: CLINIC | Age: 75
End: 2025-08-19
Payer: MEDICARE

## 2025-08-19 VITALS
WEIGHT: 142.8 LBS | TEMPERATURE: 97.4 F | HEART RATE: 66 BPM | BODY MASS INDEX: 20.44 KG/M2 | DIASTOLIC BLOOD PRESSURE: 60 MMHG | SYSTOLIC BLOOD PRESSURE: 120 MMHG | HEIGHT: 70 IN | RESPIRATION RATE: 16 BRPM | OXYGEN SATURATION: 99 %

## 2025-08-19 DIAGNOSIS — I10 PRIMARY HYPERTENSION: Primary | ICD-10-CM

## 2025-08-19 DIAGNOSIS — E87.1 HYPONATREMIA: ICD-10-CM

## 2025-08-19 DIAGNOSIS — R73.9 ELEVATED BLOOD SUGAR: ICD-10-CM

## 2025-08-19 DIAGNOSIS — D64.9 ANEMIA, UNSPECIFIED TYPE: ICD-10-CM

## 2025-08-19 PROCEDURE — 1159F MED LIST DOCD IN RCRD: CPT | Performed by: INTERNAL MEDICINE

## 2025-08-19 PROCEDURE — 99214 OFFICE O/P EST MOD 30 MIN: CPT | Performed by: INTERNAL MEDICINE

## 2025-08-19 PROCEDURE — 3074F SYST BP LT 130 MM HG: CPT | Performed by: INTERNAL MEDICINE

## 2025-08-19 PROCEDURE — 1160F RVW MEDS BY RX/DR IN RCRD: CPT | Performed by: INTERNAL MEDICINE

## 2025-08-19 PROCEDURE — 3078F DIAST BP <80 MM HG: CPT | Performed by: INTERNAL MEDICINE

## 2025-09-03 ENCOUNTER — APPOINTMENT (OUTPATIENT)
Dept: SURGERY | Facility: CLINIC | Age: 75
End: 2025-09-03
Payer: MEDICARE

## 2025-10-15 ENCOUNTER — APPOINTMENT (OUTPATIENT)
Dept: SURGERY | Facility: CLINIC | Age: 75
End: 2025-10-15
Payer: MEDICARE

## 2026-02-19 ENCOUNTER — APPOINTMENT (OUTPATIENT)
Dept: PRIMARY CARE | Facility: CLINIC | Age: 76
End: 2026-02-19
Payer: MEDICARE

## (undated) DEVICE — 1010 S-DRAPE TOWEL DRAPE 10/BX: Brand: STERI-DRAPE™

## (undated) DEVICE — GAUZE,SPONGE,FLUFF,6"X6.75",STRL,10/TRAY: Brand: MEDLINE

## (undated) DEVICE — PADDING UNDERCAST W4INXL12FT RAYON POLY SYN NONADHESIVE

## (undated) DEVICE — COTTON UNDERCAST PADDING,REGULAR FINISH: Brand: WEBRIL

## (undated) DEVICE — SHIELD, PRE-KLENZ, SOAK, MED 6ML

## (undated) DEVICE — GLOVE ORANGE PI 8   MSG9080

## (undated) DEVICE — GAUZE, KITTNER ROLL, STERILE

## (undated) DEVICE — SCISSORS, MONOPOLAR, CURVED, 8MM

## (undated) DEVICE — COVER, TIP HOT SHEARS ENDOWRIST

## (undated) DEVICE — DRAPE, ARM XI

## (undated) DEVICE — SUTURE, STRATAFIX, SPIRAL PDS PLUS, 2-0, 6IN, 15CM, CT-2, VIOLET

## (undated) DEVICE — TRAY, SURESTEP, SILICONE DRAINAGE BAG, STATLOCK, 16FR

## (undated) DEVICE — Device

## (undated) DEVICE — CLEAN KIT, ANTIFOG SCOPE, SEE SHARP 150MM

## (undated) DEVICE — APPLICATOR MEDICATED 26 CC SOLUTION HI LT ORNG CHLORAPREP

## (undated) DEVICE — HAND II: Brand: MEDLINE INDUSTRIES, INC.

## (undated) DEVICE — DRAPE SHEET, UTILITY, 26 X 15, W/ TAPE, STERILE

## (undated) DEVICE — STRAP, VELCRO, BODY, 4 X 60IN, NS

## (undated) DEVICE — MARKER, SKIN, W/ RULER, LF, STERILE

## (undated) DEVICE — BANDAGE COMPR W2INXL5YD WHT BGE POLY COT M E WRP WV HK AND

## (undated) DEVICE — SUTURE, VICRYL PLUS 3-0, SH, 27IN

## (undated) DEVICE — PREP, SCRUB, SKIN, FOAM, HIBICLENS, 4 OZ

## (undated) DEVICE — ADHESIVE, SKIN, DERMABOND ADVANCED, 15CM, PEN-STYLE

## (undated) DEVICE — FORCEPS, BIPOLAR FENESTRATED XI

## (undated) DEVICE — 450 ML BOTTLE OF 0.05% CHLORHEXIDINE GLUCONATE IN 99.95% STERILE WATER FOR IRRIGATION, USP AND APPLICATOR.: Brand: IRRISEPT ANTIMICROBIAL WOUND LAVAGE

## (undated) DEVICE — COVER LT HNDL BLU PLAS

## (undated) DEVICE — STRAP, ARM BOARD, 32 X 1.5

## (undated) DEVICE — DRAPE, SHEET, ENDOSCOPY, GENERAL, FENESTRATED, ARMBOARD COVER, 98 X 123.5 IN, DISPOSABLE, LF, STERILE

## (undated) DEVICE — GOWN,AURORA,NONREINFORCED,LARGE: Brand: MEDLINE

## (undated) DEVICE — SUTURE NONABSORBABLE MONOFILAMENT 5-0 PS-2 18 IN BLK ETHILON 1666H

## (undated) DEVICE — DRIVER, NEEDLE, MEGA SUTURE CUT, DAVINCI XI

## (undated) DEVICE — ZIMMER® STERILE DISPOSABLE TOURNIQUET CUFF, DUAL PORT, SINGLE BLADDER, 18 IN. (46 CM)

## (undated) DEVICE — SPONGE,LAP,18"X18",DLX,XR,ST,5/PK,40/PK: Brand: MEDLINE

## (undated) DEVICE — SEAL, UNIVERSAL, 5-12MM

## (undated) DEVICE — CANNULA, AIRSEAL, CAP & OBTURATOR 8MM

## (undated) DEVICE — TRAY, DRY PREP, PREMIUM

## (undated) DEVICE — TOWEL PACK, STERILE, 16X24, XRAY DETECTABLE, BLUE, 4/PK

## (undated) DEVICE — MANIFOLD, 4 PORT NEPTUNE STANDARD

## (undated) DEVICE — SUTURE, STRATAFIX, 3-0 6IN, SPIRAL PDS PLUS, RB-1, VIOLET

## (undated) DEVICE — TUBING SET, BIFURCATED, SMOKE EVAC, ACTIVATED CHARCOAL FILTERED, F/AIRSEAL

## (undated) DEVICE — GLOVE, SURGICAL, PROTEXIS PI , 7.5, PF, LF

## (undated) DEVICE — SYRINGE, CONTROL, ANGIOGRAPHIC, FIXED MALE LUER, 10 CC

## (undated) DEVICE — SUTURE, MONOCRYL, 4-0, 27 IN, PS-2, UNDYED

## (undated) DEVICE — NEEDLE, ECLIPSE, 25GA X 1-1/2 IN

## (undated) DEVICE — SPLINT CAST W3XL15IN GRN STRENGTH PLSTR OF PARIS FAST SET

## (undated) DEVICE — DRAPE, SHEET, XL

## (undated) DEVICE — CUP, MEDICINE, GRADUATED, 2 OZ, PLASTIC, DISP, LF

## (undated) DEVICE — DRESSING GZ W1XL8IN COT XRFRM N ADH OVERWRAP CURAD

## (undated) DEVICE — HOLSTER, JET SAFETY